# Patient Record
Sex: FEMALE | Race: WHITE | Employment: OTHER | URBAN - METROPOLITAN AREA
[De-identification: names, ages, dates, MRNs, and addresses within clinical notes are randomized per-mention and may not be internally consistent; named-entity substitution may affect disease eponyms.]

---

## 2017-01-31 ENCOUNTER — TELEPHONE (OUTPATIENT)
Dept: ENDOCRINOLOGY | Age: 72
End: 2017-01-31

## 2017-01-31 NOTE — TELEPHONE ENCOUNTER
Received call today around 5:30 AM. Patient noted that she had a recent upper respiratory illness with a persistent cough that has kept her from sleeping the past few nights. She notably had parathyroid surgery approx. 2 months ago and wanted to know if her persistent cough could be related to that. I advised her that it would be unusual if she had done well up until her recent upper respiratory issue. More likely, based on history provided, that it is related to upper respiratory infection. I advised she speak with her PCP to discuss a cough suppressant that might help her sleep through the night. She is advised that if it becomes chronic she can advised  [facility]. She was welcomed to call back with any other concerns.

## 2017-03-10 ENCOUNTER — TELEPHONE (OUTPATIENT)
Dept: ENDOCRINOLOGY | Age: 72
End: 2017-03-10

## 2017-03-10 NOTE — TELEPHONE ENCOUNTER
----- Message from Balaji Jorge sent at 3/10/2017  8:41 AM EST -----  Regarding: Dr. Angela Mancini: [phone]  Best contact number [phone]. Pt  had a virus and a cold since January and is feeling better now, and wants to know if she need to do anything special before she takes her blood work.

## 2017-03-10 NOTE — TELEPHONE ENCOUNTER
I returned Giovanna's call and she said she started with a \"virus\" on Jan 27th, and had some very bad coughing. She is still congested and has no energy. She wondered if it was ok to do her labs while she was feeling this way. I told her it was ok to do this and she will follow up at her appointment on the 17th.   Jareth Mejia

## 2017-03-14 LAB
ALBUMIN SERPL-MCNC: 4.6 G/DL (ref 3.5–4.8)
BUN SERPL-MCNC: 16 MG/DL (ref 8–27)
BUN/CREAT SERPL: 20 (ref 11–26)
CALCIUM SERPL-MCNC: 9 MG/DL (ref 8.7–10.3)
CHLORIDE SERPL-SCNC: 103 MMOL/L (ref 96–106)
CO2 SERPL-SCNC: 25 MMOL/L (ref 18–29)
CREAT SERPL-MCNC: 0.79 MG/DL (ref 0.57–1)
GLUCOSE SERPL-MCNC: 98 MG/DL (ref 65–99)
PHOSPHATE SERPL-MCNC: 3.8 MG/DL (ref 2.5–4.5)
POTASSIUM SERPL-SCNC: 4.1 MMOL/L (ref 3.5–5.2)
SODIUM SERPL-SCNC: 143 MMOL/L (ref 134–144)

## 2017-03-17 ENCOUNTER — OFFICE VISIT (OUTPATIENT)
Dept: ENDOCRINOLOGY | Age: 72
End: 2017-03-17

## 2017-03-17 VITALS
SYSTOLIC BLOOD PRESSURE: 136 MMHG | HEART RATE: 67 BPM | WEIGHT: 121 LBS | HEIGHT: 62 IN | DIASTOLIC BLOOD PRESSURE: 67 MMHG | BODY MASS INDEX: 22.26 KG/M2

## 2017-03-17 DIAGNOSIS — E21.0 PRIMARY HYPERPARATHYROIDISM (HCC): Primary | ICD-10-CM

## 2017-03-17 NOTE — MR AVS SNAPSHOT
Visit Information Date & Time Provider Department Dept. Phone Encounter #  
 3/17/2017 11:10 AM Nelle Duverney, MD [facility] [phone] [id number] Follow-up Instructions Return in about 6 months (around 9/17/2017). Upcoming Health Maintenance Date Due DTaP/Tdap/Td series (1 - Tdap) 7/28/1965 BREAST CANCER SCRN MAMMOGRAM 7/28/1994 FOBT Q 1 YEAR AGE 50-75 7/28/1994 ZOSTER VACCINE AGE 60> 7/28/2004 GLAUCOMA SCREENING Q2Y 7/28/2009 OSTEOPOROSIS SCREENING (DEXA) 7/28/2009 Pneumococcal 65+ Low/Medium Risk (1 of 2 - PCV13) 7/28/2009 MEDICARE YEARLY EXAM 7/28/2009 INFLUENZA AGE 9 TO ADULT 8/1/2016 Allergies as of 3/17/2017  Review Complete On: 3/17/2017 By: Nelle Duverney, MD  
 No Known Allergies Current Immunizations  Never Reviewed No immunizations on file. Not reviewed this visit You Were Diagnosed With   
  
 Codes Comments [facility]    -  Primary ICD-10-CM: E21.0 ICD-9-CM: 252.01 Vitals BP Pulse Height(growth percentile) Weight(growth percentile) BMI OB Status 136/67 (BP 1 Location: Left arm, BP Patient Position: Sitting) 67 5' 2\" (1.575 m) 121 lb (54.9 kg) 22.13 kg/m2 Hysterectomy Smoking Status Never Smoker Vitals History BMI and BSA Data Body Mass Index Body Surface Area  
 22.13 kg/m 2 1.55 m 2 Preferred Pharmacy Pharmacy Name Phone [facility] [address] [facility], [facility] [address] Your Updated Medication List  
  
   
This list is accurate as of: 3/17/17 11:37 AM.  Always use your most recent med list. amLODIPine 5 mg tablet Commonly known as:  Lauren Fields Take 1 Tab by mouth daily. cholecalciferol (vitamin D3) 2,000 unit Tab Take  by mouth. docusate sodium 50 mg capsule Commonly known as:  Andrea Noyola  
 Take 2 Caps by mouth two (2) times daily as needed for Constipation. Follow-up Instructions Return in about 6 months (around 9/17/2017). To-Do List   
 09/11/2017 Lab:  PTH INTACT   
  
 09/11/2017 Lab:  RENAL FUNCTION PANEL Introducing [facility] SERVICES! [facility] introduces [employer] patient portal. Now you can access parts of your medical record, email your doctor's office, and request medication refills online. 1. In your internet browser, go to [email or web address] 2. Click on the First Time User? Click Here link in the Sign In box. You will see the New Member Sign Up page. 3. Enter your Dakim Access Code exactly as it appears below. You will not need to use this code after youve completed the sign-up process. If you do not sign up before the expiration date, you must request a new code. · Dakim Access Code: [email or web address] Expires: 6/15/2017 10:45 AM 
 
4. Enter the last four digits of your Social Security Number (xxxx) and Date of Birth (mm/dd/yyyy) as indicated and click Submit. You will be taken to the next sign-up page. 5. Create a Dakim ID. This will be your Dakim login ID and cannot be changed, so think of one that is secure and easy to remember. 6. Create a Dakim password. You can change your password at any time. 7. Enter your Password Reset Question and Answer. This can be used at a later time if you forget your password. 8. Enter your e-mail address. You will receive e-mail notification when new information is available in [address] 9. Click Sign Up. You can now view and download portions of your medical record. 10. Click the Download Summary menu link to download a portable copy of your medical information. If you have questions, please visit the Frequently Asked Questions section of the Dakim website. Remember, Dakim is NOT to be used for urgent needs. For medical emergencies, dial 911. Now available from your iPhone and Android! Please provide this summary of care documentation to your next provider. Your primary care clinician is listed as Kasie Max. If you have any questions after today's visit, please call [phone].

## 2017-03-17 NOTE — PROGRESS NOTES
Chief Complaint   Patient presents with    Thyroid Problem     parathyroid. PCP and pharmacy verified   Record since last visit reviewed  History of Present Illness: Giovanna Sierra is a 66 y.o. female here for follow up of primary hyperparathyroidism. Pt presented to her PCP in July 2015 with complaints of difficulty walking, standing, blurred vision and watery eyes. As part of the work-up pt was found to have a TSH of 1.73, B12 555, Cr 0.8 Calcium 11.8, Phos 3.0 and . At our initial visit our work up included imaging which showed an oval solid nodule contiguous with the lower pole of the left   thyroid lobe compatible with enlarged parathyroid, and correlating with   today's nuclear imaging. It measures approximately 1.6 x 0.7 x 0.6 cm. The initial images demonstrate an asymmetric focus of increased radiotracer   localization adjacent to the lower pole of the left thyroid lobe. The delayed images demonstrate persistent radiotracer focal retention, left   Lower. Pt was referred to Dr. Christal Olvera, who took her to the OR on 12/2/16 and found two enlarged parathyroid glands (Inferior Left and Superior Right). These were resected and sent to pathology. They were both read as benign parathyroid adenoma. Her pre-op PTH was 242 and her post-op PTH was 7.3. She has labs drawn last week her Ca was 9.0. She is still taking the Vitamin D supplement daily. Pt notes that she was feeling stronger and feeling better after surgery. In January 2017 she got a viral GI infection and that \"Hit me pretty hard\". She notes that she is recovering well from the infection and is feeling better. Pt notes she is recovering well from the surgery. She notes that her energy is improving, though she notes \"I still get tired very easily\". She notes her symptoms of joint pains are still present, but better. She denies numbness in her hand or sierra-oral numbness.     Pt denies issues of renal stones, dysuria, hematuria, flank pain. She notes that \"sometimes I can get a little auguste\". She denies new joint pains, worsening joint pains or joint swelling or erythema. Pt has hx of osteopenia, but not osteoporosis. Current Outpatient Prescriptions   Medication Sig    docusate sodium (COLACE) 50 mg capsule Take 2 Caps by mouth two (2) times daily as needed for Constipation.  amLODIPine (NORVASC) 5 mg tablet Take 1 Tab by mouth daily.  cholecalciferol, vitamin D3, 2,000 unit tab Take  by mouth. No current facility-administered medications for this visit. No Known Allergies  Review of Systems:  - Cardiovascular: no chest pain  - Neurological: no tremors  - Integumentary: skin is normal    Physical Examination:  Blood pressure 136/67, pulse 67, height 5' 2\" (1.575 m), weight 121 lb (54.9 kg). - General: pleasant, no distress, good eye contact   - Neck: Op site healing well, no swelling or erythema,   - Cardiovascular: regular, normal rate, nl s1 and s2, no m/r/g   - Integumentary: skin is normal, no edema  - Neurological: reflexes 2+ at biceps, no tremors, - Chevostic sign  - Psychiatric: normal mood and affect    Data Reviewed:   Component      Latest Ref Rng & Units 3/13/2017           8:56 AM   Glucose      65 - 99 mg/dL 98   BUN      8 - 27 mg/dL 16   Creatinine      0.57 - 1.00 mg/dL 0.79   GFR est non-AA      >59 mL/min/1.73 75   GFR est AA      >59 mL/min/1.73 86   BUN/Creatinine ratio      11 - 26 20   Sodium      134 - 144 mmol/L 143   Potassium      3.5 - 5.2 mmol/L 4.1   Chloride      96 - 106 mmol/L 103   CO2      18 - 29 mmol/L 25   Calcium      8.7 - 10.3 mg/dL 9.0   Phosphorus      2.5 - 4.5 mg/dL 3.8   Albumin      3.5 - 4.8 g/dL 4.6     Assessment/Plan:   1) Hyperparathyroidism > Pt underwent parathyroidectomy and has recovered well. Her Ca level is good and she is doing well overall.  Will check a PTH and renal panel before our next visit in 6 months and if everything looks good we can discharge her from my care at that time. 2) Hypovitaminosis D > Pt to continue her Vitamin D 2000 units daily. Pt voices understanding and agreement with the plan. RTC 6 months      Follow-up Disposition:  Return in about 6 months (around 9/17/2017).     Copy sent to:  Dr. Lindsey Wagner

## 2017-03-23 DIAGNOSIS — E21.0 PRIMARY HYPERPARATHYROIDISM (HCC): ICD-10-CM

## 2017-04-20 ENCOUNTER — TELEPHONE (OUTPATIENT)
Dept: ENDOCRINOLOGY | Age: 72
End: 2017-04-20

## 2017-04-20 NOTE — TELEPHONE ENCOUNTER
Patient is requesting a call back in regards to taking vitamins. She can be reached at [phone]. Thank you.

## 2017-04-20 NOTE — TELEPHONE ENCOUNTER
Spoke with patient. She states that when she was on taking Vitamin D3, she felt great. Then she got a virus x 2 mos and feels aches/pains again, like prior to thyroid surgery. She had heard that taking Vitamin D with Magnesium, helps. She wonders if she should be taking Magnesium with the Vitamin D. If so what strength? She prefers the \"gel\" kind.

## 2017-04-20 NOTE — TELEPHONE ENCOUNTER
She can take Vitamin D 2000 units a day. She can get this at any drug store, it is not expensive. For the magnesium she can get 250mg per day.

## 2017-08-23 LAB
ALBUMIN SERPL-MCNC: 4.5 G/DL (ref 3.5–4.8)
BUN SERPL-MCNC: 18 MG/DL (ref 8–27)
BUN/CREAT SERPL: 19 (ref 12–28)
CALCIUM SERPL-MCNC: 9.8 MG/DL (ref 8.7–10.3)
CHLORIDE SERPL-SCNC: 99 MMOL/L (ref 96–106)
CO2 SERPL-SCNC: 25 MMOL/L (ref 18–29)
CREAT SERPL-MCNC: 0.96 MG/DL (ref 0.57–1)
GLUCOSE SERPL-MCNC: 100 MG/DL (ref 65–99)
INTERPRETATION: NORMAL
PHOSPHATE SERPL-MCNC: 4.6 MG/DL (ref 2.5–4.5)
POTASSIUM SERPL-SCNC: 4.6 MMOL/L (ref 3.5–5.2)
PTH-INTACT SERPL-MCNC: 19 PG/ML (ref 15–65)
SODIUM SERPL-SCNC: 140 MMOL/L (ref 134–144)

## 2017-09-07 ENCOUNTER — OFFICE VISIT (OUTPATIENT)
Dept: ENDOCRINOLOGY | Age: 72
End: 2017-09-07

## 2017-09-07 VITALS
SYSTOLIC BLOOD PRESSURE: 139 MMHG | DIASTOLIC BLOOD PRESSURE: 74 MMHG | HEIGHT: 62 IN | HEART RATE: 63 BPM | WEIGHT: 126.6 LBS | BODY MASS INDEX: 23.3 KG/M2

## 2017-09-07 DIAGNOSIS — E55.9 HYPOVITAMINOSIS D: ICD-10-CM

## 2017-09-07 DIAGNOSIS — E21.0 PRIMARY HYPERPARATHYROIDISM (HCC): Primary | ICD-10-CM

## 2017-09-07 NOTE — PROGRESS NOTES
Chief Complaint   Patient presents with    Thyroid Problem     Parathyroidism      Record since last visit reviewed  History of Present Illness: Giovanna Sierra is a 67 y.o. female here for follow up of primary hyperparathyroidism. Pt presented to her PCP in July 2015 with complaints of difficulty walking, standing, blurred vision and watery eyes. As part of the work-up pt was found to have a TSH of 1.73, B12 555, Cr 0.8 Calcium 11.8, Phos 3.0 and . At our initial visit our work up included imaging which showed an oval solid nodule contiguous with the lower pole of the left   thyroid lobe compatible with enlarged parathyroid, and correlating with   today's nuclear imaging. It measures approximately 1.6 x 0.7 x 0.6 cm. The initial images demonstrate an asymmetric focus of increased radiotracer   localization adjacent to the lower pole of the left thyroid lobe. The delayed images demonstrate persistent radiotracer focal retention, left   Lower. Pt was referred to Dr. Karime Arce, who took her to the OR on 12/2/16 and found two enlarged parathyroid glands (Inferior Left and Superior Right). These were resected and sent to pathology. They were both read as benign parathyroid adenoma. Her pre-op PTH was 242 and her post-op PTH was 7.3. She has labs drawn last week her Ca was 9.8 with Albumin 4.5 and PTH of 19. She is still taking the Vitamin D supplement 2000 units daily. Pt notes she is recovering well from the surgery. She notes that her energy is improving, though she notes \"I still get tired very easily\". She notes her symptoms of joint pains are still present, but better. She denies numbness in her hand or sierra-oral numbness. Pt denies issues of renal stones, dysuria, hematuria, flank pain. She notes that \"sometimes I can get a little auguste\". She denies new joint pains, worsening joint pains or joint swelling or erythema. Pt has hx of osteopenia, but not osteoporosis.        Current Outpatient Prescriptions   Medication Sig    docusate sodium (COLACE) 50 mg capsule Take 2 Caps by mouth two (2) times daily as needed for Constipation.  amLODIPine (NORVASC) 5 mg tablet Take 1 Tab by mouth daily.  cholecalciferol, vitamin D3, 2,000 unit tab Take  by mouth. No current facility-administered medications for this visit. No Known Allergies  Review of Systems:  - Cardiovascular: no chest pain  - Neurological: no tremors  - Integumentary: skin is normal    Physical Examination:  Blood pressure 139/74, pulse 63, height 5' 2\" (1.575 m), weight 126 lb 9.6 oz (57.4 kg). - General: pleasant, no distress, good eye contact   - Neck: Op site healing well, no swelling or erythema,   - Cardiovascular: regular, normal rate, nl s1 and s2, no m/r/g   - Integumentary: skin is normal, no edema  - Neurological: reflexes 2+ at biceps, no tremors, - Chevostic sign  - Psychiatric: normal mood and affect    Data Reviewed:   Component      Latest Ref Rng & Units 8/22/2017 8/22/2017          10:08 AM 10:08 AM   Glucose      65 - 99 mg/dL  100 (H)   BUN      8 - 27 mg/dL  18   Creatinine      0.57 - 1.00 mg/dL  0.96   GFR est non-AA      >59 mL/min/1.73  59 (L)   GFR est AA      >59 mL/min/1.73  68   BUN/Creatinine ratio      12 - 28  19   Sodium      134 - 144 mmol/L  140   Potassium      3.5 - 5.2 mmol/L  4.6   Chloride      96 - 106 mmol/L  99   CO2      18 - 29 mmol/L  25   Calcium      8.7 - 10.3 mg/dL  9.8   Phosphorus      2.5 - 4.5 mg/dL  4.6 (H)   Albumin      3.5 - 4.8 g/dL  4.5   PTH, Intact      15 - 65 pg/mL 19      Assessment/Plan:   1) Hyperparathyroidism > Pt underwent parathyroidectomy and has recovered well. Her Ca and PTH levels look good and they have been good since her surgery in December 2016. No need for further follow up with me at this time. Dr. Clark Seymour should check her Ca level every 6 months to ensure it remains in a good range.     2) Hypovitaminosis D > Pt to continue her Vitamin D 2000 units daily. Pt voices understanding and agreement with the plan.     Copy sent to:  Dr. Henry Carmona

## 2017-09-07 NOTE — MR AVS SNAPSHOT
Visit Information Date & Time Provider Department Dept. Phone Encounter #  
 9/7/2017 10:50 AM Isrrael Bassett, [address] [place] [address] [facility] [id number] Upcoming Health Maintenance Date Due DTaP/Tdap/Td series (1 - Tdap) 7/28/1965 BREAST CANCER SCRN MAMMOGRAM 7/28/1994 FOBT Q 1 YEAR AGE 50-75 7/28/1994 ZOSTER VACCINE AGE 60> 5/28/2004 GLAUCOMA SCREENING Q2Y 7/28/2009 OSTEOPOROSIS SCREENING (DEXA) 7/28/2009 Pneumococcal 65+ Low/Medium Risk (1 of 2 - PCV13) 7/28/2009 MEDICARE YEARLY EXAM 7/28/2009 INFLUENZA AGE 9 TO ADULT 8/1/2017 Allergies as of 9/7/2017  Review Complete On: 9/7/2017 By: Isrrael Bassett MD  
 No Known Allergies Current Immunizations  Never Reviewed No immunizations on file. Not reviewed this visit You Were Diagnosed With   
  
 Codes Comments Primary hyperparathyroidism ([facility]    -  Primary ICD-10-CM: E21.0 ICD-9-CM: 252.01 Hypovitaminosis D     ICD-10-CM: E55.9 ICD-9-CM: 268.9 Vitals BP Pulse Height(growth percentile) Weight(growth percentile) BMI OB Status 139/74 (BP 1 Location: Right arm, BP Patient Position: Sitting) 63 5' 2\" (1.575 m) 126 lb 9.6 oz (57.4 kg) 23.16 kg/m2 Hysterectomy Smoking Status Never Smoker Vitals History BMI and BSA Data Body Mass Index Body Surface Area  
 23.16 kg/m 2 1.58 m 2 Preferred Pharmacy Pharmacy Name Phone [facility] [address] [place] [address] Your Updated Medication List  
  
   
This list is accurate as of: 9/7/17 10:53 AM.  Always use your most recent med list. amLODIPine 5 mg tablet Commonly known as:  Sohail Landis Take 1 Tab by mouth daily. cholecalciferol (vitamin D3) 2,000 unit Tab Take  by mouth. docusate sodium 50 mg capsule Commonly known as:  Viola Perez  
 Take 2 Caps by mouth two (2) times daily as needed for Constipation. Introducing [facility] & HEALTH SERVICES! [facility] introduces uberall patient portal. Now you can access parts of your medical record, email your doctor's office, and request medication refills online. 1. In your internet browser, go to [email or web address] 2. Click on the First Time User? Click Here link in the Sign In box. You will see the New Member Sign Up page. 3. Enter your uberall Access Code exactly as it appears below. You will not need to use this code after youve completed the sign-up process. If you do not sign up before the expiration date, you must request a new code. · uberall Access Code: [email or web address] Expires: 12/6/2017 10:53 AM 
 
4. Enter the last four digits of your Social Security Number (xxxx) and Date of Birth (mm/dd/yyyy) as indicated and click Submit. You will be taken to the next sign-up page. 5. Create a uberall ID. This will be your uberall login ID and cannot be changed, so think of one that is secure and easy to remember. 6. Create a uberall password. You can change your password at any time. 7. Enter your Password Reset Question and Answer. This can be used at a later time if you forget your password. 8. Enter your e-mail address. You will receive e-mail notification when new information is available in [address] 9. Click Sign Up. You can now view and download portions of your medical record. 10. Click the Download Summary menu link to download a portable copy of your medical information. If you have questions, please visit the Frequently Asked Questions section of the uberall website. Remember, uberall is NOT to be used for urgent needs. For medical emergencies, dial 911. Now available from your iPhone and Android! Please provide this summary of care documentation to your next provider. Your primary care clinician is listed as Richmond Garcia. If you have any questions after today's visit, please call [phone].

## 2017-09-11 DIAGNOSIS — E21.0 PRIMARY HYPERPARATHYROIDISM (HCC): ICD-10-CM

## 2017-09-18 ENCOUNTER — TELEPHONE (OUTPATIENT)
Dept: ENDOCRINOLOGY | Age: 72
End: 2017-09-18

## 2017-09-18 DIAGNOSIS — E55.9 HYPOVITAMINOSIS D: ICD-10-CM

## 2017-09-18 DIAGNOSIS — R53.83 FATIGUE, UNSPECIFIED TYPE: ICD-10-CM

## 2017-09-18 DIAGNOSIS — E21.0 PRIMARY HYPERPARATHYROIDISM (HCC): Primary | ICD-10-CM

## 2017-09-18 DIAGNOSIS — R25.2 BILATERAL LEG CRAMPS: ICD-10-CM

## 2017-09-18 DIAGNOSIS — E83.52 HYPERCALCEMIA: ICD-10-CM

## 2017-09-18 NOTE — TELEPHONE ENCOUNTER
Spoke with patient. She states that she had parathyroid surgery 12/2016. In Feb, 2017 she developed a cough for 14 days, then was \"sick\" for 2 months. Since May she has had tiredness, joint pains and bones hurt. These have gradually developed since May, 2017. For the past month, she has been having bad cramps in lower and upper legs. Her thighs are sore. She is taking 1000 iu of vitamin D. Wonders if needs to increase the dose. She walks 1/2-2 miles per day. Yesterday was a really bad day.

## 2017-09-18 NOTE — TELEPHONE ENCOUNTER
said OK to add a magnesium level. Patient has been notified. Lab orders have been faxed to the [address] in [address] [place] [address].

## 2017-09-18 NOTE — TELEPHONE ENCOUNTER
Patient called to say that she had Parathyroid surgery earlier, and is having \"thyroid symptoms\" again. She would appreciate a call back at:  [phone].

## 2017-09-19 LAB
25(OH)D3+25(OH)D2 SERPL-MCNC: 22.8 NG/ML (ref 30–100)
ALBUMIN SERPL-MCNC: 4.5 G/DL (ref 3.5–4.8)
BUN SERPL-MCNC: 17 MG/DL (ref 8–27)
BUN/CREAT SERPL: 20 (ref 12–28)
CALCIUM SERPL-MCNC: 9.2 MG/DL (ref 8.7–10.3)
CHLORIDE SERPL-SCNC: 100 MMOL/L (ref 96–106)
CO2 SERPL-SCNC: 23 MMOL/L (ref 18–29)
CREAT SERPL-MCNC: 0.85 MG/DL (ref 0.57–1)
GLUCOSE SERPL-MCNC: 109 MG/DL (ref 65–99)
MAGNESIUM SERPL-MCNC: 2.2 MG/DL (ref 1.6–2.3)
PHOSPHATE SERPL-MCNC: 3.9 MG/DL (ref 2.5–4.5)
POTASSIUM SERPL-SCNC: 3.9 MMOL/L (ref 3.5–5.2)
SODIUM SERPL-SCNC: 139 MMOL/L (ref 134–144)
T4 FREE SERPL-MCNC: 0.65 NG/DL (ref 0.82–1.77)
TSH SERPL DL<=0.005 MIU/L-ACNC: 2.45 UIU/ML (ref 0.45–4.5)

## 2017-09-19 NOTE — PROGRESS NOTES
1) Her Calcium and Magnesium levels are looking very good. 2) Her Vitamin D level was 23, the goal is to keep the Vitamin D above 30, so increasing her Vitamin D to 2000 units per day would be appropriate. 3) Her Thyroid level is looking good as well, she does not need to start a thyroid hormone replacement.

## 2017-09-20 ENCOUNTER — TELEPHONE (OUTPATIENT)
Dept: ENDOCRINOLOGY | Age: 72
End: 2017-09-20

## 2017-09-20 NOTE — TELEPHONE ENCOUNTER
----- Message from Eder Dominguez sent at 9/20/2017  8:30 AM EDT -----  Regarding: /Telephone  Pt would like to speak with Erica Salvador. Pt stated to call before 12 noon. Best contact number is [phone].

## 2017-09-27 ENCOUNTER — TELEPHONE (OUTPATIENT)
Dept: ENDOCRINOLOGY | Age: 72
End: 2017-09-27

## 2017-09-27 NOTE — TELEPHONE ENCOUNTER
Patient stated that she had spoken with you last week about increasing her vitamin d. She stated that she has been doing great with that but has had a headache for about 5 days now and nothing seems to get rid of it. She would like to know if it is related to the increase in the vitamin d. Patient can be reached at [phone].

## 2017-09-27 NOTE — TELEPHONE ENCOUNTER
Patient has been advised. She might try to stop the Vit D, then if headache resolves, will rechallenge. Advised to see PCP if not better.

## 2017-10-03 ENCOUNTER — HOSPITAL ENCOUNTER (OUTPATIENT)
Dept: MRI IMAGING | Age: 72
Discharge: HOME | End: 2017-10-03
Attending: NEUROLOGICAL SURGERY
Payer: MEDICARE

## 2017-10-03 DIAGNOSIS — D49.7 PITUITARY TUMOR: ICD-10-CM

## 2017-10-03 DIAGNOSIS — R93.0 NONSPECIFIC ABNORMAL FINDINGS ON RADIOLOGICAL AND EXAMINATION OF SKULL AND HEAD: ICD-10-CM

## 2017-10-03 PROCEDURE — 74011000258 HC RX REV CODE- 258: Performed by: NEUROLOGICAL SURGERY

## 2017-10-03 PROCEDURE — 70544 MR ANGIOGRAPHY HEAD W/O DYE: CPT

## 2017-10-03 PROCEDURE — A9585 GADOBUTROL INJECTION: HCPCS | Performed by: NEUROLOGICAL SURGERY

## 2017-10-03 PROCEDURE — 70553 MRI BRAIN STEM W/O & W/DYE: CPT

## 2017-10-03 PROCEDURE — 74011250636 HC RX REV CODE- 250/636: Performed by: NEUROLOGICAL SURGERY

## 2017-10-03 RX ADMIN — SODIUM CHLORIDE 30 ML: 900 INJECTION, SOLUTION INTRAVENOUS at 14:07

## 2017-10-03 RX ADMIN — GADOBUTROL 7 ML: 604.72 INJECTION INTRAVENOUS at 14:07

## 2017-10-04 ENCOUNTER — HOSPITAL ENCOUNTER (INPATIENT)
Age: 72
LOS: 6 days | Discharge: HOME | DRG: 614 | End: 2017-10-10
Attending: NEUROLOGICAL SURGERY | Admitting: NEUROLOGICAL SURGERY
Payer: MEDICARE

## 2017-10-04 PROBLEM — D35.2 PITUITARY ADENOMA (HCC): Status: ACTIVE | Noted: 2017-10-04

## 2017-10-04 LAB
ANION GAP SERPL CALC-SCNC: 12 MMOL/L (ref 5–15)
BASOPHILS # BLD: 0 K/UL (ref 0–0.1)
BASOPHILS NFR BLD: 0 % (ref 0–1)
BUN SERPL-MCNC: 13 MG/DL (ref 6–20)
BUN/CREAT SERPL: 16 (ref 12–20)
CALCIUM SERPL-MCNC: 8.6 MG/DL (ref 8.5–10.1)
CHLORIDE SERPL-SCNC: 88 MMOL/L (ref 97–108)
CO2 SERPL-SCNC: 22 MMOL/L (ref 21–32)
CORTIS PM SERPL-MCNC: 6.9 UG/DL (ref 3.1–16.7)
CREAT SERPL-MCNC: 0.81 MG/DL (ref 0.55–1.02)
EOSINOPHIL # BLD: 0.1 K/UL (ref 0–0.4)
EOSINOPHIL NFR BLD: 2 % (ref 0–7)
ERYTHROCYTE [DISTWIDTH] IN BLOOD BY AUTOMATED COUNT: 12 % (ref 11.5–14.5)
GLUCOSE SERPL-MCNC: 149 MG/DL (ref 65–100)
HCT VFR BLD AUTO: 36.5 % (ref 35–47)
HGB BLD-MCNC: 13.2 G/DL (ref 11.5–16)
INR PPP: 1 (ref 0.9–1.1)
LYMPHOCYTES # BLD: 1.4 K/UL (ref 0.8–3.5)
LYMPHOCYTES NFR BLD: 23 % (ref 12–49)
MCH RBC QN AUTO: 30.4 PG (ref 26–34)
MCHC RBC AUTO-ENTMCNC: 36.2 G/DL (ref 30–36.5)
MCV RBC AUTO: 84.1 FL (ref 80–99)
MONOCYTES # BLD: 0.4 K/UL (ref 0–1)
MONOCYTES NFR BLD: 7 % (ref 5–13)
NEUTS SEG # BLD: 3.9 K/UL (ref 1.8–8)
NEUTS SEG NFR BLD: 68 % (ref 32–75)
PLATELET # BLD AUTO: 182 K/UL (ref 150–400)
POTASSIUM SERPL-SCNC: 3.3 MMOL/L (ref 3.5–5.1)
PROTHROMBIN TIME: 10.4 SEC (ref 9–11.1)
RBC # BLD AUTO: 4.34 M/UL (ref 3.8–5.2)
SODIUM SERPL-SCNC: 122 MMOL/L (ref 136–145)
WBC # BLD AUTO: 5.8 K/UL (ref 3.6–11)

## 2017-10-04 PROCEDURE — 0NQC4ZZ REPAIR SPHENOID BONE, PERCUTANEOUS ENDOSCOPIC APPROACH: ICD-10-PCS | Performed by: NEUROLOGICAL SURGERY

## 2017-10-04 PROCEDURE — 85025 COMPLETE CBC W/AUTO DIFF WBC: CPT | Performed by: NURSE PRACTITIONER

## 2017-10-04 PROCEDURE — 36415 COLL VENOUS BLD VENIPUNCTURE: CPT | Performed by: NURSE PRACTITIONER

## 2017-10-04 PROCEDURE — 0G904ZX DRAINAGE OF PITUITARY GLAND, PERCUTANEOUS ENDOSCOPIC APPROACH, DIAGNOSTIC: ICD-10-PCS | Performed by: NEUROLOGICAL SURGERY

## 2017-10-04 PROCEDURE — 74011250637 HC RX REV CODE- 250/637: Performed by: NURSE PRACTITIONER

## 2017-10-04 PROCEDURE — 65660000000 HC RM CCU STEPDOWN

## 2017-10-04 PROCEDURE — 82533 TOTAL CORTISOL: CPT | Performed by: NURSE PRACTITIONER

## 2017-10-04 PROCEDURE — 85610 PROTHROMBIN TIME: CPT | Performed by: NURSE PRACTITIONER

## 2017-10-04 PROCEDURE — 83003 ASSAY GROWTH HORMONE (HGH): CPT | Performed by: NURSE PRACTITIONER

## 2017-10-04 PROCEDURE — 74011250636 HC RX REV CODE- 250/636: Performed by: NURSE PRACTITIONER

## 2017-10-04 PROCEDURE — 0GB04ZZ EXCISION OF PITUITARY GLAND, PERCUTANEOUS ENDOSCOPIC APPROACH: ICD-10-PCS | Performed by: NEUROLOGICAL SURGERY

## 2017-10-04 PROCEDURE — 0JB80ZZ EXCISION OF ABDOMEN SUBCUTANEOUS TISSUE AND FASCIA, OPEN APPROACH: ICD-10-PCS | Performed by: NEUROLOGICAL SURGERY

## 2017-10-04 PROCEDURE — 80048 BASIC METABOLIC PNL TOTAL CA: CPT | Performed by: NURSE PRACTITIONER

## 2017-10-04 PROCEDURE — 84146 ASSAY OF PROLACTIN: CPT | Performed by: NURSE PRACTITIONER

## 2017-10-04 RX ORDER — SODIUM CHLORIDE 0.9 % (FLUSH) 0.9 %
5-10 SYRINGE (ML) INJECTION EVERY 8 HOURS
Status: DISCONTINUED | OUTPATIENT
Start: 2017-10-04 | End: 2017-10-06 | Stop reason: HOSPADM

## 2017-10-04 RX ORDER — ACETAMINOPHEN 325 MG/1
650 TABLET ORAL
Status: DISCONTINUED | OUTPATIENT
Start: 2017-10-04 | End: 2017-10-06 | Stop reason: HOSPADM

## 2017-10-04 RX ORDER — LABETALOL HYDROCHLORIDE 5 MG/ML
10 INJECTION, SOLUTION INTRAVENOUS
Status: COMPLETED | OUTPATIENT
Start: 2017-10-04 | End: 2017-10-06

## 2017-10-04 RX ORDER — ONDANSETRON 4 MG/1
4 TABLET, ORALLY DISINTEGRATING ORAL
Status: DISCONTINUED | OUTPATIENT
Start: 2017-10-04 | End: 2017-10-06 | Stop reason: HOSPADM

## 2017-10-04 RX ORDER — HYDROCORTISONE SODIUM SUCCINATE 100 MG/2ML
100 INJECTION, POWDER, FOR SOLUTION INTRAMUSCULAR; INTRAVENOUS EVERY 12 HOURS
Status: DISCONTINUED | OUTPATIENT
Start: 2017-10-04 | End: 2017-10-07

## 2017-10-04 RX ORDER — AMLODIPINE BESYLATE 5 MG/1
5 TABLET ORAL DAILY
Status: DISCONTINUED | OUTPATIENT
Start: 2017-10-05 | End: 2017-10-10 | Stop reason: HOSPADM

## 2017-10-04 RX ORDER — HYDROCODONE BITARTRATE AND ACETAMINOPHEN 5; 325 MG/1; MG/1
1 TABLET ORAL
Status: DISCONTINUED | OUTPATIENT
Start: 2017-10-04 | End: 2017-10-10 | Stop reason: HOSPADM

## 2017-10-04 RX ORDER — HYDRALAZINE HYDROCHLORIDE 20 MG/ML
10 INJECTION INTRAMUSCULAR; INTRAVENOUS
Status: COMPLETED | OUTPATIENT
Start: 2017-10-04 | End: 2017-10-06

## 2017-10-04 RX ORDER — FAMOTIDINE 20 MG/1
20 TABLET, FILM COATED ORAL 2 TIMES DAILY
Status: DISCONTINUED | OUTPATIENT
Start: 2017-10-04 | End: 2017-10-10 | Stop reason: HOSPADM

## 2017-10-04 RX ORDER — SODIUM CHLORIDE 0.9 % (FLUSH) 0.9 %
5-10 SYRINGE (ML) INJECTION AS NEEDED
Status: DISCONTINUED | OUTPATIENT
Start: 2017-10-04 | End: 2017-10-06 | Stop reason: HOSPADM

## 2017-10-04 RX ADMIN — HYDROCORTISONE SODIUM SUCCINATE 100 MG: 100 INJECTION, POWDER, FOR SOLUTION INTRAMUSCULAR; INTRAVENOUS at 20:46

## 2017-10-04 RX ADMIN — FAMOTIDINE 20 MG: 20 TABLET ORAL at 19:34

## 2017-10-04 RX ADMIN — Medication 10 ML: at 20:54

## 2017-10-04 RX ADMIN — ONDANSETRON 4 MG: 4 TABLET, ORALLY DISINTEGRATING ORAL at 19:32

## 2017-10-04 RX ADMIN — HYDROCODONE BITARTRATE AND ACETAMINOPHEN 1 TABLET: 5; 325 TABLET ORAL at 19:34

## 2017-10-04 RX ADMIN — HYDROCODONE BITARTRATE AND ACETAMINOPHEN 1 TABLET: 5; 325 TABLET ORAL at 23:18

## 2017-10-04 RX ADMIN — HYDRALAZINE HYDROCHLORIDE 10 MG: 20 INJECTION INTRAMUSCULAR; INTRAVENOUS at 19:34

## 2017-10-04 NOTE — IP AVS SNAPSHOT
2700 [facility] [address] [place] [address] 
[phone] Patient: Anca Landers MRN: [id number] :1945 You are allergic to the following No active allergies Immunizations Administered for This Admission Name Date Influenza Vaccine (Quad) PF  Deferred () Recent Documentation Height Weight BMI OB Status Smoking Status 1.575 m 58 kg 23.37 kg/m2 Hysterectomy Never Smoker Emergency Contacts Name Discharge Info Relation Home Work Mobile Self,[place] DISCHARGE CAREGIVER [3] Spouse [3] [phone] About your hospitalization You were admitted on:  2017 You last received care in the:  [facility] 6S NEURO-SCI TELE You were discharged on:  October 10, 2017 Unit phone number:  [phone] Why you were hospitalized Your primary diagnosis was:  Not on File Your diagnoses also included:  Pituitary Adenoma (Hcc) Providers Seen During Your Hospitalizations Provider Role Specialty Primary office phone Zac Betancourt MD Attending Provider Neurosurgery [phone] Your Primary Care Physician (PCP) Primary Care Physician Office Phone Office Fax Ryan Martinez [phone] Follow-up Information Follow up With Details Comments Contact Info Lamar Cabezas MD Schedule an appointment as soon as possible for a visit in 1 week hospital follow-up [address] [facility] [address] [facility] [address] [phone] Zac Betancourt MD Schedule an appointment as soon as possible for a visit in 1 month  935 [facility]. [address] [place] [address] 
[phone] Katelyn Norwood MD Schedule an appointment as soon as possible for a visit in 10 days For wound re-check [address] [facility] 57 
[phone] Lopez Tracy MD Schedule an appointment as soon as possible for a visit in 3 weeks endocrine follow-up 200 [facility] [address] [facility] 
[phone] Current Discharge Medication List  
  
START taking these medications Dose & Instructions Dispensing Information Comments Morning Noon Evening Bedtime HYDROcodone-acetaminophen 5-325 mg per tablet Commonly known as:  Carlos Monteiro Your last dose was: Your next dose is:    
   
   
 Dose:  1 Tab Take 1 Tab by mouth every four (4) hours as needed. Max Daily Amount: 6 Tabs. Quantity:  22 Tab Refills:  0  
     
   
   
   
  
 hydrocortisone 10 mg tablet Commonly known as:  CORTEF Your last dose was: Your next dose is: Take 2 tabs PO every morning and 1 tab PO at 5PM  
 Quantity:  90 Tab Refills:  1  
     
   
   
   
  
 levothyroxine 75 mcg tablet Commonly known as:  SYNTHROID Your last dose was: Your next dose is:    
   
   
 Dose:  75 mcg Take 1 Tab by mouth Daily (before breakfast). Quantity:  30 Tab Refills:  1  
     
   
   
   
  
 sodium chloride 0.65 % nasal spray Commonly known as:  OCEAN Your last dose was: Your next dose is:    
   
   
 Dose:  2 Spray 2 Sprays by Both Nostrils route three (3) times daily for 10 days. Quantity:  3 mL Refills:  0 CONTINUE these medications which have CHANGED Dose & Instructions Dispensing Information Comments Morning Noon Evening Bedtime  
 amLODIPine 5 mg tablet Commonly known as:  Jody Vidal What changed:  how much to take Your last dose was: Your next dose is:    
   
   
 Dose:  7.5 mg Take 1.5 Tabs by mouth daily. Quantity:  45 Tab Refills:  0 CONTINUE these medications which have NOT CHANGED Dose & Instructions Dispensing Information Comments Morning Noon Evening Bedtime  
 cholecalciferol (vitamin D3) 2,000 unit Tab Your last dose was: Your next dose is: Take  by mouth. Refills:  0  
     
   
   
   
  
 docusate sodium 50 mg capsule Commonly known as:  Atlanta Flower Your last dose was: Your next dose is:    
   
   
 Dose:  100 mg Take 2 Caps by mouth two (2) times daily as needed for Constipation. Quantity:  30 Cap Refills:  2 Where to Get Your Medications Information on where to get these meds will be given to you by the nurse or doctor. ! Ask your nurse or doctor about these medications  
  amLODIPine 5 mg tablet HYDROcodone-acetaminophen 5-325 mg per tablet  
 hydrocortisone 10 mg tablet  
 levothyroxine 75 mcg tablet  
 sodium chloride 0.65 % nasal spray Discharge Instructions Diet - as tolerated. Continue 1200 ml fluid restriction per 24 hours. This should be continued for the next few weeks until you see your endocrine doctor. Activity - Keep head elevated on 2-3 pillows for 2-3 weeks. No driving while on pain medication. No bending over, lifting greater than 5 pounds. Wound care - continue to use saline nasal spray three times a day. It is ok to shower. If you develop a fever greater than 100.5, severe nausea, vomiting, severe neck stiffness, shortness of breath, chest pain, leg pain, leg swelling, call the MD at [phone]. Someone is on call 24/7. If you have trouble getting into the endocrinologist office in less than a month, please call Dr. Plasencia Payment office. It is important that you take your blood pressure medication as prescribed. You should follow-up with your PCP in 1 week to discuss your BP medication. You should get a home blood pressure cuff and check your blood pressure in the morning and in the evening and keep a log that you can take to your doctor's visit. You have been given prescriptions for the following medications: 1. Cortef - take 20 mg in the am and 10 mg at 5PM - this is a steroid.  It augments your body's natural steroid production since the pituitary gland function has been affected by the tumor that was removed. Take them medication as directed until you see your endocrinologist to determine if you need to continue to take this medication. 2. Levothyroxine 75 mcg - this medication supplements your thyroid hormone. Take this medication as prescribed until you see your endocrine doctor to determine if you need to continue this replacement. 3. Amlodipine 7.5 mg - this is an increase in the dosage of your home blood pressure medication. The dosage equals 1.5 tabs of the BP pills you have at home. 4. Norco 5/325 mg - this is a pain medication. Take this medication as needed. This medication has 325 mg of Tylenol in it. You have a maximum amount of 4000 mg of Tylenol in a 24 hour period. You may take plain tylenol in between doses of this medication, but will need to keep track of the Tylenol amount. 5. Saline nasal spray - 2 sprays both nostrils three times a day - use as directed. This helps to moisten the nasal passageways. Cont this medication until you follow-up with Dr. Keshav Kumar. Discharge Orders None [facility] Announcement We are excited to announce that we are making your provider's discharge notes available to you in [facility]. You will see these notes when they are completed and signed by the physician that discharged you from your recent hospital stay. If you have any questions or concerns about any information you see in [facility], please call the Health Information Department where you were seen or reach out to your Primary Care Provider for more information about your plan of care. Introducing [facility]! Coby Day introduces [facility] patient portal. Now you can access parts of your medical record, email your doctor's office, and request medication refills online. 1. In your internet browser, go to [email or web address] 2. Click on the First Time User? Click Here link in the Sign In box. You will see the New Member Sign Up page. 3. Enter your [employer] Access Code exactly as it appears below. You will not need to use this code after youve completed the sign-up process. If you do not sign up before the expiration date, you must request a new code. · [employer] Access Code: [email or web address] Expires: 12/6/2017 10:53 AM 
 
4. Enter the last four digits of your Social Security Number (xxxx) and Date of Birth (mm/dd/yyyy) as indicated and click Submit. You will be taken to the next sign-up page. 5. Create a [employer] ID. This will be your [employer] login ID and cannot be changed, so think of one that is secure and easy to remember. 6. Create a [employer] password. You can change your password at any time. 7. Enter your Password Reset Question and Answer. This can be used at a later time if you forget your password. 8. Enter your e-mail address. You will receive e-mail notification when new information is available in [address] 9. Click Sign Up. You can now view and download portions of your medical record. 10. Click the Download Summary menu link to download a portable copy of your medical information. If you have questions, please visit the Frequently Asked Questions section of the [employer] website. Remember, [employer] is NOT to be used for urgent needs. For medical emergencies, dial 911. Now available from your iPhone and Android! General Information Please provide this summary of care documentation to your next provider. Patient Signature:  ____________________________________________________________ Date:  ____________________________________________________________  
  
[facility] Provider Signature:  ____________________________________________________________ Date:  ____________________________________________________________

## 2017-10-04 NOTE — IP AVS SNAPSHOT
[address] [place] [address] 
[phone] Patient: Iain Potts MRN: [id number] :1945 Current Discharge Medication List  
  
START taking these medications Dose & Instructions Dispensing Information Comments Morning Noon Evening Bedtime HYDROcodone-acetaminophen 5-325 mg per tablet Commonly known as:  March Castles Your last dose was: Your next dose is:    
   
   
 Dose:  1 Tab Take 1 Tab by mouth every four (4) hours as needed. Max Daily Amount: 6 Tabs. Quantity:  22 Tab Refills:  0  
     
   
   
   
  
 hydrocortisone 10 mg tablet Commonly known as:  CORTEF Your last dose was: Your next dose is: Take 2 tabs PO every morning and 1 tab PO at 5PM  
 Quantity:  90 Tab Refills:  1  
     
   
   
   
  
 levothyroxine 75 mcg tablet Commonly known as:  SYNTHROID Your last dose was: Your next dose is:    
   
   
 Dose:  75 mcg Take 1 Tab by mouth Daily (before breakfast). Quantity:  30 Tab Refills:  1  
     
   
   
   
  
 sodium chloride 0.65 % nasal spray Commonly known as:  OCEAN Your last dose was: Your next dose is:    
   
   
 Dose:  2 Spray 2 Sprays by Both Nostrils route three (3) times daily for 10 days. Quantity:  3 mL Refills:  0 CONTINUE these medications which have CHANGED Dose & Instructions Dispensing Information Comments Morning Noon Evening Bedtime  
 amLODIPine 5 mg tablet Commonly known as:  Cristofer Purdy What changed:  how much to take Your last dose was: Your next dose is:    
   
   
 Dose:  7.5 mg Take 1.5 Tabs by mouth daily. Quantity:  45 Tab Refills:  0 CONTINUE these medications which have NOT CHANGED Dose & Instructions Dispensing Information Comments Morning Noon Evening Bedtime cholecalciferol (vitamin D3) 2,000 unit Tab Your last dose was: Your next dose is: Take  by mouth. Refills:  0  
     
   
   
   
  
 docusate sodium 50 mg capsule Commonly known as:  Billie Anaya Your last dose was: Your next dose is:    
   
   
 Dose:  100 mg Take 2 Caps by mouth two (2) times daily as needed for Constipation. Quantity:  30 Cap Refills:  2 Where to Get Your Medications Information on where to get these meds will be given to you by the nurse or doctor. ! Ask your nurse or doctor about these medications  
  amLODIPine 5 mg tablet HYDROcodone-acetaminophen 5-325 mg per tablet  
 hydrocortisone 10 mg tablet  
 levothyroxine 75 mcg tablet  
 sodium chloride 0.65 % nasal spray

## 2017-10-05 ENCOUNTER — TELEPHONE (OUTPATIENT)
Dept: ENDOCRINOLOGY | Age: 72
End: 2017-10-05

## 2017-10-05 ENCOUNTER — APPOINTMENT (OUTPATIENT)
Dept: CT IMAGING | Age: 72
DRG: 614 | End: 2017-10-05
Attending: NURSE PRACTITIONER
Payer: MEDICARE

## 2017-10-05 ENCOUNTER — APPOINTMENT (OUTPATIENT)
Dept: CT IMAGING | Age: 72
DRG: 614 | End: 2017-10-05
Attending: NEUROLOGICAL SURGERY
Payer: MEDICARE

## 2017-10-05 LAB
ANION GAP SERPL CALC-SCNC: 12 MMOL/L (ref 5–15)
ANION GAP SERPL CALC-SCNC: 14 MMOL/L (ref 5–15)
BUN SERPL-MCNC: 15 MG/DL (ref 6–20)
BUN SERPL-MCNC: 16 MG/DL (ref 6–20)
BUN/CREAT SERPL: 15 (ref 12–20)
BUN/CREAT SERPL: 19 (ref 12–20)
CALCIUM SERPL-MCNC: 8.5 MG/DL (ref 8.5–10.1)
CALCIUM SERPL-MCNC: 8.6 MG/DL (ref 8.5–10.1)
CHLORIDE SERPL-SCNC: 84 MMOL/L (ref 97–108)
CHLORIDE SERPL-SCNC: 89 MMOL/L (ref 97–108)
CO2 SERPL-SCNC: 20 MMOL/L (ref 21–32)
CO2 SERPL-SCNC: 21 MMOL/L (ref 21–32)
CREAT SERPL-MCNC: 0.86 MG/DL (ref 0.55–1.02)
CREAT SERPL-MCNC: 1.03 MG/DL (ref 0.55–1.02)
GLUCOSE SERPL-MCNC: 122 MG/DL (ref 65–100)
GLUCOSE SERPL-MCNC: 143 MG/DL (ref 65–100)
MAGNESIUM SERPL-MCNC: 1.7 MG/DL (ref 1.6–2.4)
OSMOLALITY SERPL: 258 MOSM/KG H2O
OSMOLALITY UR: 427 MOSM/KG H2O
POTASSIUM SERPL-SCNC: 3.5 MMOL/L (ref 3.5–5.1)
POTASSIUM SERPL-SCNC: 3.8 MMOL/L (ref 3.5–5.1)
POTASSIUM UR-SCNC: 44 MMOL/L
SODIUM SERPL-SCNC: 117 MMOL/L (ref 136–145)
SODIUM SERPL-SCNC: 121 MMOL/L (ref 136–145)
SODIUM SERPL-SCNC: 123 MMOL/L (ref 136–145)
SODIUM UR-SCNC: 21 MMOL/L
URATE SERPL-MCNC: 3.8 MG/DL (ref 2.6–6)

## 2017-10-05 PROCEDURE — 83735 ASSAY OF MAGNESIUM: CPT | Performed by: INTERNAL MEDICINE

## 2017-10-05 PROCEDURE — 74011250637 HC RX REV CODE- 250/637: Performed by: NURSE PRACTITIONER

## 2017-10-05 PROCEDURE — 65660000000 HC RM CCU STEPDOWN

## 2017-10-05 PROCEDURE — 36415 COLL VENOUS BLD VENIPUNCTURE: CPT | Performed by: NURSE PRACTITIONER

## 2017-10-05 PROCEDURE — 74011636320 HC RX REV CODE- 636/320: Performed by: NEUROLOGICAL SURGERY

## 2017-10-05 PROCEDURE — 80048 BASIC METABOLIC PNL TOTAL CA: CPT | Performed by: INTERNAL MEDICINE

## 2017-10-05 PROCEDURE — 84300 ASSAY OF URINE SODIUM: CPT | Performed by: INTERNAL MEDICINE

## 2017-10-05 PROCEDURE — 74011250637 HC RX REV CODE- 250/637: Performed by: INTERNAL MEDICINE

## 2017-10-05 PROCEDURE — 74011250636 HC RX REV CODE- 250/636: Performed by: NURSE PRACTITIONER

## 2017-10-05 PROCEDURE — 70460 CT HEAD/BRAIN W/DYE: CPT

## 2017-10-05 PROCEDURE — 83930 ASSAY OF BLOOD OSMOLALITY: CPT | Performed by: NURSE PRACTITIONER

## 2017-10-05 PROCEDURE — 84133 ASSAY OF URINE POTASSIUM: CPT | Performed by: INTERNAL MEDICINE

## 2017-10-05 PROCEDURE — 83935 ASSAY OF URINE OSMOLALITY: CPT | Performed by: INTERNAL MEDICINE

## 2017-10-05 PROCEDURE — 84550 ASSAY OF BLOOD/URIC ACID: CPT | Performed by: INTERNAL MEDICINE

## 2017-10-05 PROCEDURE — 84295 ASSAY OF SERUM SODIUM: CPT | Performed by: NURSE PRACTITIONER

## 2017-10-05 PROCEDURE — 74011000258 HC RX REV CODE- 258: Performed by: NEUROLOGICAL SURGERY

## 2017-10-05 RX ORDER — TOLVAPTAN 15 MG/1
15 TABLET ORAL ONCE
Status: COMPLETED | OUTPATIENT
Start: 2017-10-05 | End: 2017-10-05

## 2017-10-05 RX ORDER — SODIUM CHLORIDE 0.9 % (FLUSH) 0.9 %
10 SYRINGE (ML) INJECTION
Status: COMPLETED | OUTPATIENT
Start: 2017-10-05 | End: 2017-10-05

## 2017-10-05 RX ADMIN — TOLVAPTAN 15 MG: 15 TABLET ORAL at 15:12

## 2017-10-05 RX ADMIN — ONDANSETRON 4 MG: 4 TABLET, ORALLY DISINTEGRATING ORAL at 09:01

## 2017-10-05 RX ADMIN — Medication 10 ML: at 15:15

## 2017-10-05 RX ADMIN — FAMOTIDINE 20 MG: 20 TABLET ORAL at 19:37

## 2017-10-05 RX ADMIN — Medication 10 ML: at 12:23

## 2017-10-05 RX ADMIN — HYDROCORTISONE SODIUM SUCCINATE 100 MG: 100 INJECTION, POWDER, FOR SOLUTION INTRAMUSCULAR; INTRAVENOUS at 09:01

## 2017-10-05 RX ADMIN — HYDROCORTISONE SODIUM SUCCINATE 100 MG: 100 INJECTION, POWDER, FOR SOLUTION INTRAMUSCULAR; INTRAVENOUS at 21:38

## 2017-10-05 RX ADMIN — IOPAMIDOL 100 ML: 612 INJECTION, SOLUTION INTRAVENOUS at 12:23

## 2017-10-05 RX ADMIN — Medication 10 ML: at 05:48

## 2017-10-05 RX ADMIN — FAMOTIDINE 20 MG: 20 TABLET ORAL at 09:01

## 2017-10-05 RX ADMIN — AMLODIPINE BESYLATE 5 MG: 5 TABLET ORAL at 09:14

## 2017-10-05 RX ADMIN — ONDANSETRON 4 MG: 4 TABLET, ORALLY DISINTEGRATING ORAL at 02:57

## 2017-10-05 RX ADMIN — Medication 10 ML: at 21:38

## 2017-10-05 RX ADMIN — HYDROCODONE BITARTRATE AND ACETAMINOPHEN 1 TABLET: 5; 325 TABLET ORAL at 09:31

## 2017-10-05 RX ADMIN — SODIUM CHLORIDE 100 ML: 900 INJECTION, SOLUTION INTRAVENOUS at 12:23

## 2017-10-05 NOTE — INTERDISCIPLINARY ROUNDS
IDR/SLIDR Summary          Patient: Joaquin Perez MRN: [id number]    Age: 67 y.o. YOB: 1945 Room/Bed: [facility]   Admit Diagnosis: Pituitary Tumor  [facility]  Principal Diagnosis: <principal problem not specified>   Goals: safety  Readmission: NO  Quality Measure: Not applicable  VTE Prophylaxis: Mechanical  Influenza Vaccine screening completed? YES  Pneumococcal Vaccine screening completed? NO  Mobility needs: No   Nutrition plan:Yes  Consults:    Financial concerns:No  Escalated to CM? NO  RRAT Score:16   Interventions:  Testing due for pt today?  YES  LOS: 1 days Expected length of stay tbd days  Discharge plan: home   PCP: Unruly Leahy MD  Transportation needs: No    Days before discharge:two or more days before discharge   Discharge disposition: Home    Signed:     Dianna Armenta  10/5/2017  9:44 AM

## 2017-10-05 NOTE — CONSULTS
[address]   611 [facility], [address]   1930 [facility]       Name:  Dayan Marion   MR#:  [id number]   :  1945   Account #:  [de-identified]    Date of Consultation:  10/05/2017   Date of Adm:  10/04/2017       REASON FOR CONSULTATION: Hyponatremia. Thanks for the consult. I had the pleasure of seeing this patient. She is a pleasant 70-year-old   female. History of hypertension, diagnosed with primary   hyperparathyroidism and up to 2 adenoma removed in  and . Issue with her was that the patient has been having headaches and she   ended up having an MRI of the brain and the MRI was done this month   which showed large pituitary mass containing fluid levels suggesting   positive old hemorrhage and 2 pituitary microadenoma. She was   admitted here for surgery, found to have a sodium of 122 yesterday   and was 121 today with a hyposmolarity with it. Potassium on the low   side and we were called to see her for that. The patient said she is not   polydipsic. She has no history of liver problems or heart problems. No   lower extremity edema. She is not polyuric. She is a little bit nauseous,   she cannot keep much medicine in. PAST MEDICAL HISTORY: Includes hypertension, primary   hyperparathyroidism status post surgery in 2016, pituitary mass   large with suspect bleed, hyponatremia which is not documented   before. REVIEW OF SYSTEMS: As per the HPI, for others are negative. MEDICATIONS   As an outpatient included:   1. Amlodipine that she did not take. 2. Vitamin D.   3. Colace. Inpatient medications are as follows:   1. Amlodipine 5 mg daily. 2. Pepcid. 3. Hydrocortisone 100 mg q.12h. ALLERGIES: NO KNOWN ALLERGIES. FAMILY HISTORY: Reviewed. PHYSICAL EXAMINATION   VITAL SIGNS: Blood pressure is 149/66, saturating 95%, heart rate   79. NECK: JVD is negative. ABDOMEN: Soft. EXTREMITIES: No edema.    NEUROLOGIC: Alert and oriented to time and place. LABORATORY DATA: Sodium 121, potassium 3.3, BUN is 13,   creatinine 0.8, calcium 8.6. Theophylline 255. No urine studies. Hemoglobin is 13.2 yesterday and 182 platelets. IMPRESSION   1. Hyponatremia. Fairly of recent onset since last month it was not   present. Suspect SIADH from her CNS mass, but we will send the   workup for that. The issue would be the planned surgery tomorrow and   we need to correct that sodium. She is mildly symptomatic but we do   not want to overcorrect it so will not aim any sodium above 130 and will   discuss it with no surgery  is to do that type of surgery with a sodium   in the projected range. 2. History of primary hyperparathyroidism, removed in 12/2016. 3. Hypertension. 4. Large pituitary mass. RECOMMENDATIONS   1. Get urine studies. 2. Hyponatremia workup. 3. If workup in favor of SIADH, will get Conivaptan with serial sodium   monitoring. 4. Discussed in length with her and with no surgery. MD JADYN Neri / BA   D:  10/05/2017   11:04   T:  10/05/2017   11:42   Job #:  [id number]

## 2017-10-05 NOTE — PROGRESS NOTES
Primary Nurse Mary Asif and YOVANNY MALAVE [facility], RN performed a dual skin assessment on this patient No impairment noted  Jose Rafael score is 21

## 2017-10-05 NOTE — CONSULTS
Called by NS to see patient  Seen and examined  Thanks for the consuLT  A/P:hyponatremia,of recent onset likely 2nd SIADH but need work up         Hx of 2 Parathyroid adenoma resction in 12.2016 for primary hyperpara         HTN         Pituitary mass for surgery  Check urine testing. .. Vickie Hernandez Vickie Hernandez   Already on IV steroids  If work up in favor of SIADH than will give tolvaptan;do not want to overcorrect her sodium but also need a better sodium in case surgery scheduled tomorrow  Discussed in length with her and her  and NS

## 2017-10-05 NOTE — PROGRESS NOTES
Care Management Interventions  PCP Verified by CM: Yes Ellen Her MD)  Mode of Transport at Discharge: Other (see comment)  Transition of Care Consult (CM Consult):  (To be determined.)  MyChart Signup: No  Discharge Durable Medical Equipment: No  Physical Therapy Consult: Yes  Occupational Therapy Consult: Yes  Speech Therapy Consult: No  Current Support Network: Lives with Spouse  Confirm Follow Up Transport: Other (see comment) (To be determined.)  Plan discussed with Pt/Family/Caregiver: Yes  Freedom of Choice Offered:  (N/A)  Discharge Location  Discharge Placement: Other: (Nydia Arroyo determined post op)    CM met with patient and her  Andrzej Peña with whom she lives. They have 6 supportive children \"between the 2 of us\" as well as a large extended family. Patient reports very good family /social support. Patient expects return to independent functioning. Patient confirmed PCP, health insurance, and prescription coverage. Patient is anticipating surgery tomorrow 10/6/17. CM to follow for discharge planning needs.

## 2017-10-05 NOTE — PROGRESS NOTES
Spiritual Care volunteer visit. [facility] Staff  (Kenia George Patient Care Specialist)   Paging Service [phone]

## 2017-10-05 NOTE — TELEPHONE ENCOUNTER
Received call from Dr. Ancelmo Castañeda regarding patients admission to [facility].     Patient notably s/p parathyroidectomy for primary hyperparathyroidism and was followed by Dr. Raegan Zhou in the endocrine clinic. 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 There is a fluid fluid level within this mass and there is rim enhancement. Mass extends into the suprasellar region. Superior to the mass there is a 4 mm focus of enhancement most likely related to the infundibulum. \"    Few labs pending including Prolactin. Nephrology following for hyponatremia, SIADH? Cortisol 6.9, reported to have been started on steroids. Patient is anticipated to have surgery soon. I will advise Dr. Raegan Zhou of her admission to [facility]. She will need a full re-evaluation of pituitary function at some point post-op. Of interest will be path immunostaining however with necrotic tissue, may not be revealing. Gloria Santa.  39 [facility]  [address] [place] [address]

## 2017-10-05 NOTE — PROGRESS NOTES
Feels much better since starting steroids. No headache. 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 I spoke to her endocrinologist Dr Jose Antonio Kelly who will consult as well. If Na is in near normal range, would proceed with surgery tomorrow.  No other focal deficits and clearly looks and feels better compared to when  I spoke to her on phone yesterday when I told her to come straight to the hospital.

## 2017-10-05 NOTE — PROGRESS NOTES
*ATTENTION:  This note has been created by a medical student for educational purposes only. Please do not refer to the content of this note for clinical decision-making, billing, or other purposes. Please see attending physicians note to obtain clinical information on this patient. *                                                                History & Physical  Mathew [facility]-NP Student    Date of admission: 10/4/2017    Patient name: Joaquin Perez  MRN: [id number]  YOB: 1945  Age: 67 y.o. Primary care provider:  Unruly Leahy MD     Source of Information: patient, medical records and                           Chief complaint: Pituitary mass     History of present illness  Giovanna Sierra is a 67 y.o. female with a past medical history of hypertension, high cholesterol, s/p parathyroidectomy who presents with complaints of persistent headache for about 2 weeks. Prior to this, she had not been feeling like herself and decreased energy for about 18 months. She has been followed by endocrinology, Dr. Yamileth Su, and was diagnosed with low vitamin D levels and hyperparathyroidism. She was treated with Vitamin D supplement and s/p parathyroidectomy by Dr. Ralph  12/2/2016. She stated she felt better initially post surgery with increased energy levels. Now recently, her symptoms are back including headache. She initially attributed the headaches to allergies. The headaches continued and she noticed an increased in the pain over the past 3 days. She saw her endocrinologist, Dr. Yamileth Su, on 9/27/17 and was referred to Dr. Radha Edge. A non-contrast MRI/MRA of the head was obtained on 10/3/17 and revealed a pituitary mass. She then began having visual disturbances on 10/3/17 and due to her MRI results, she was advised to be directly admitted to [facility] [place] by Dr. Radha Edge on 10/4/17 with plan for removal of the mass on Friday, 10/6/17.      Past Medical History:   Diagnosis Date    Adverse effect of anesthesia     \"TAKES A LONG TIME FOR ME TO COME OUT OF IT\"    Arthritis     High cholesterol     Hypertension     Osteopenia     Thyroid disease     Parathyroid gland removal      Past Surgical History:   Procedure Laterality Date    HX OTHER SURGICAL  2006    FACE LIFT     HX PARATHYROIDECTOMY  2016    HX AMANDA AND BSO  1980s    HX TONSILLECTOMY  age 9     Prior to Admission medications    Medication Sig Start Date End Date Taking? Authorizing Provider   docusate sodium (COLACE) 50 mg capsule Take 2 Caps by mouth two (2) times daily as needed for Constipation. 12/3/16  Yes Nita Toussaint MD   amLODIPine (NORVASC) 5 mg tablet Take 1 Tab by mouth daily. 16  Yes Monica Wall MD   cholecalciferol, vitamin D3, 2,000 unit tab Take  by mouth. Yes Historical Provider     No Known Allergies   Family History   Problem Relation Age of Onset    Heart Attack Mother     No Known Problems Father       in the war    Heart Attack Maternal Aunt     Cancer Maternal Aunt      bone cancer    Anesth Problems Neg Hx       Family history reviewed and non-contributory. Social history  Patient resides  x  Independently      With family care      Assisted living      SNF    Ambulates  x  Independently      With cane       Assisted walker         Alcohol history   x  None     Social     Chronic   Smoking history  x  None     Former smoker     Current smoker     History   Smoking Status    Never Smoker   Smokeless Tobacco    Never Used       Code status  x  Full code     DNR/DNI        Code status discussed with the patient/caregivers. Full Code    Review of systems  The patient denies any fever, chills, chest pain, cough, congestion, recent illness, palpitations, or dysuria.   + Vomiting, nausea  + Headache   + Decreased urination  + Blurry and double vision   The remainder of the review of systems was reviewed and is noncontributory.     Physical Examination   Visit Vitals    /78 (BP 1 Location: Right arm, BP Patient Position: At rest)    Pulse 94    Temp 97.7 °F (36.5 °C)    Resp 20    Wt 57.5 kg (126 lb 12.8 oz)    SpO2 95%    BMI 23.19 kg/m2          O2 Device: Room air    General:  Alert, cooperative, no distress   Head:  Normocephalic, without obvious abnormality, atraumatic   Eyes:  Conjunctivae/corneas clear. PERRL, dysconjugate gaze. Left side peripheral vision loss. E/N/M/T: Nares normal. No nasal drainage. Lips and tongue normal   Teeth and gums normal   Neck: Normal appearance and movements, symmetrical, trachea midline   Lungs:   Symmetrical chest expansion and respiratory effort  Clear to auscultation bilaterally       Heart:  Regular rhythm   Sounds normal; no murmur, click, rub or gallop   Abdomen:   Soft, no tenderness  Bowel sounds normal       Extremities: Extremities normal, atraumatic  No cyanosis or edema   Pulses 2+ and symmetric all extremities       Musculo-      skeletal: Normal symmetry, ROM, strength and tone   Neuro: Muscle strength 5/5 UE + LE   equal, strong  Gait steady   Speech is clear    Psych: Alert, oriented x3  Normal affect, judgement and insight         Data Review    EKG:  normal EKG, normal sinus rhythm, unchanged from previous tracings. 24 Hour Results:  Recent Results (from the past 24 hour(s))   CBC WITH AUTOMATED DIFF    Collection Time: 10/04/17  7:05 PM   Result Value Ref Range    WBC 5.8 3.6 - 11.0 K/uL    RBC 4.34 3.80 - 5.20 M/uL    HGB 13.2 11.5 - 16.0 g/dL    HCT 36.5 35.0 - 47.0 %    MCV 84.1 80.0 - 99.0 FL    MCH 30.4 26.0 - 34.0 PG    MCHC 36.2 30.0 - 36.5 g/dL    RDW 12.0 11.5 - 14.5 %    PLATELET 952 829 - 773 K/uL    NEUTROPHILS 68 32 - 75 %    LYMPHOCYTES 23 12 - 49 %    MONOCYTES 7 5 - 13 %    EOSINOPHILS 2 0 - 7 %    BASOPHILS 0 0 - 1 %    ABS. NEUTROPHILS 3.9 1.8 - 8.0 K/UL    ABS. LYMPHOCYTES 1.4 0.8 - 3.5 K/UL    ABS. MONOCYTES 0.4 0.0 - 1.0 K/UL    ABS. EOSINOPHILS 0.1 0.0 - 0.4 K/UL    ABS.  BASOPHILS 0.0 0.0 - 0.1 K/UL   METABOLIC PANEL, BASIC    Collection Time: 10/04/17  7:05 PM   Result Value Ref Range    Sodium 122 (L) 136 - 145 mmol/L    Potassium 3.3 (L) 3.5 - 5.1 mmol/L    Chloride 88 (L) 97 - 108 mmol/L    CO2 22 21 - 32 mmol/L    Anion gap 12 5 - 15 mmol/L    Glucose 149 (H) 65 - 100 mg/dL    BUN 13 6 - 20 MG/DL    Creatinine 0.81 0.55 - 1.02 MG/DL    BUN/Creatinine ratio 16 12 - 20      GFR est AA >60 >60 ml/min/1.73m2    GFR est non-AA >60 >60 ml/min/1.73m2    Calcium 8.6 8.5 - 10.1 MG/DL   PROTHROMBIN TIME + INR    Collection Time: 10/04/17  7:05 PM   Result Value Ref Range    INR 1.0 0.9 - 1.1      Prothrombin time 10.4 9.0 - 11.1 sec   CORTISOL, PM    Collection Time: 10/04/17  7:12 PM   Result Value Ref Range    Cortisol, p.m. 6.9 3.1 - 16.7 ug/dL   OSMOLALITY, SERUM/PLASMA    Collection Time: 10/05/17  8:47 AM   Result Value Ref Range    Osmolality, serum/plasma 258 mOsm/kg H2O   SODIUM    Collection Time: 10/05/17  8:47 AM   Result Value Ref Range    Sodium 121 (L) 136 - 145 mmol/L     Recent Labs      10/04/17   1905   WBC  5.8   HGB  13.2   HCT  36.5   PLT  182     Recent Labs      10/05/17   0847  10/04/17   1905   NA  121*  122*   K   --   3.3*   CL   --   88*   CO2   --   22   GLU   --   149*   BUN   --   13   CREA   --   0.81   CA   --   8.6   INR   --   1.0       Imaging  MRI of brain without contrast on 10/3/17: Large pituitary mass containing a fluid/fluid level suggesting possible old  hemorrhage into a pituitary macroadenoma. While Rathke cleft cyst may also be in the differential, it is less likely and not favored. See dimensions and details above. Microvascular ischemic and other age-related change.     MRA of brain without contrast on 10/3/17: No flow limiting stenosis or intracranial aneurysm      Assessment and Plan   Active Problems:    Pituitary adenoma ([facility] [address] (10/4/2017)      1.  Pituitary mass   - Plan for OR for removal on 10/6/17 with Dr. Sebastian Hernandez   - Pain control with PRN tylenol and norco   - Neuro checks - NPO at midnight   - Cortisol level 6.9, continue solu-cortef 100mg BID   - Prolactin level pending   - CT of head with brain lab today   - Obtain consent     2. Hypertension  - Continue norvasc daily  - Cardiac monitor   - SBP goal < 160  - PRN Labetalol and hydralazine      3.  Hyponatremia  - Na+ 122 yesterday, 121 today, previously 139 back in September   - Repeat labs @ noon  - Awaiting urine studies   - 1200 cc fluid restriction   - Nephrology consult, following    Diet: Regular, NPO at midnight tonight   Activity: Up with assist  DVT prophylaxis: SCDs  Isolation precautions: None  Consultations: Nephrology   Anticipated disposition: TBD    Plan discussed with patient and  at bedside          Signed by: Hilary White  [facility]-NP Student    October 5, 2017 at 11:25 AM

## 2017-10-05 NOTE — PROGRESS NOTES
Urine testing in favor of SIADH;will give one dose tolvaptan and stop fluid restriciton;serial sodium monitoring;patient aware of small risk of overcorrecting sodium with tolvaptan

## 2017-10-05 NOTE — PROGRESS NOTES
Problem: Falls - Risk of  Goal: *Absence of Falls  Document Erasmo Fall Risk and appropriate interventions in the flowsheet.    Outcome: Progressing Towards Goal  Fall Risk Interventions:              Medication Interventions: Teach patient to arise slowly, Evaluate medications/consider consulting pharmacy, Patient to call before getting OOB

## 2017-10-05 NOTE — ROUTINE PROCESS
Bedside shift change report given to [facility] (oncoming nurse) by Ekta Hooker (offgoing nurse). Report included the following information SBAR, Kardex, MAR, Accordion and Cardiac Rhythm NSR.

## 2017-10-05 NOTE — H&P
History & Physical    Date of admission: 10/4/2017    Patient name: Paul Sims  MRN: [id number]  YOB: 1945  Age: 67 y.o. Primary care provider:  Melvina Ndiaye MD     Source of Information: patient, medical records                              Chief complaint: pituitary mass     History of present illness  Giovanna Sierra is a 67 y.o. female who presents with a pituitary mass. The patient  Has not been feeling well for the past year. She states she has been feeling tired and not her usual self for about the past 3 years now, more specifically the last 18 months. She saw an endocrinologist who diagnosed her with low vitamin D levels. She was also found to have enlarged parathyroid glands. She saw Dr. Tony Harmon who removed 2 enlarged parathyroid glands in 12/2016. The patient states after this surgery, she felt a lot better for about 2 months. Then she got a respiratory virus and never bounced back. She states 2 weeks ago she developed headaches. She saw her endocrinologist who ordered a head CT. The patient was found to have a pituitary cyst. Only axial images were visible on this scan. She was referred to Dr. Amisha Garcia who ordered an MRI. 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 She was directly admitted to the hospital last night with plans for surgery tomorrow. Her admission labs did reveal a sodium level of 122. This was repeated and found to be 121. The patient's sodium level on 09/18/17 was 139 and she has no evidence of hyponatremia on prior labs. She did vomit yesterday. She denies recent change in weight, abnormal hair growth, change in ring size or shoe size. Her headache has worsened over the past 4 days. She has never had sinus surgery or nasal surgery.     Past Medical History:   Diagnosis Date    Adverse effect of anesthesia     \"TAKES A LONG TIME FOR ME TO COME OUT OF IT\"    Arthritis     High cholesterol  Hypertension     Osteopenia     Thyroid disease     HAVING SURGERY ON 15      Past Surgical History:   Procedure Laterality Date    HX OTHER SURGICAL  2006    FACE LIFT     HX AMANDA AND BSO  1980s    HX TONSILLECTOMY  age 9     Prior to Admission medications    Medication Sig Start Date End Date Taking? Authorizing Provider   docusate sodium (COLACE) 50 mg capsule Take 2 Caps by mouth two (2) times daily as needed for Constipation. 12/3/16  Yes Chelsea Blake MD   amLODIPine (NORVASC) 5 mg tablet Take 1 Tab by mouth daily. 16  Yes Margarita Shore MD   cholecalciferol, vitamin D3, 2,000 unit tab Take  by mouth. Yes Historical Provider     No Known Allergies   Family History   Problem Relation Age of Onset    Heart Attack Mother     No Known Problems Father       in the war    Heart Attack Maternal Aunt     Cancer Maternal Aunt      bone cancer    Anesth Problems Neg Hx       Family history reviewed and non-contributory. Social history  Patient resides  X  Independently      With family care      Assisted living      SNF    Ambulates  X  Independently      With cane       Assisted walker         Alcohol history   X  None     Social     Chronic   Smoking history  X  None     Former smoker     Current smoker     History   Smoking Status    Never Smoker   Smokeless Tobacco    Never Used       Code status  X  Full code     DNR/DNI        Code status discussed with the patient/caregivers. Full Code    Review of systems  The patient denies any fever, chills, chest pain, cough, congestion, recent illness, palpitations, or dysuria. A comprehensive review of systems was negative except for that written in the History of Present Illness. The remainder of the review of systems was reviewed and is noncontributory.     Physical Examination   Visit Vitals    /66    Pulse 73    Temp 97.7 °F (36.5 °C)    Resp 16    Wt 57.5 kg (126 lb 12.8 oz)    SpO2 95%    BMI 23.19 kg/m2          O2 Device: Room air    NAD. A&Ox3. Follows commands. Speech clear. Affect normal.  PERRL. Dysconjugate gaze when looking to the right. Full visual fields when tested to confrontation. Face symmetric. Palate symmetric. Tongue midline. NAZARIO. Strength 5/5 in UE and LE BL. Negative drift. Gait based and steady  Heart RRR  Lungs CTA BL  Abd soft, NT. Normal bowel sounds  Ext no edema    MRI brain with and without contrast on 10/03/17 shows large pituitary mass containing a fluid/fluid level suggesting possible old hemorrhage into a pituitary macroadenoma. While Rathke cleft cyst may also be in the differential, it is less likely and not favored. See dimensions and details above. Microvascular ischemic and other age-related change. Data Review    24 Hour Results:  Recent Results (from the past 24 hour(s))   CBC WITH AUTOMATED DIFF    Collection Time: 10/04/17  7:05 PM   Result Value Ref Range    WBC 5.8 3.6 - 11.0 K/uL    RBC 4.34 3.80 - 5.20 M/uL    HGB 13.2 11.5 - 16.0 g/dL    HCT 36.5 35.0 - 47.0 %    MCV 84.1 80.0 - 99.0 FL    MCH 30.4 26.0 - 34.0 PG    MCHC 36.2 30.0 - 36.5 g/dL    RDW 12.0 11.5 - 14.5 %    PLATELET 501 241 - 450 K/uL    NEUTROPHILS 68 32 - 75 %    LYMPHOCYTES 23 12 - 49 %    MONOCYTES 7 5 - 13 %    EOSINOPHILS 2 0 - 7 %    BASOPHILS 0 0 - 1 %    ABS. NEUTROPHILS 3.9 1.8 - 8.0 K/UL    ABS. LYMPHOCYTES 1.4 0.8 - 3.5 K/UL    ABS. MONOCYTES 0.4 0.0 - 1.0 K/UL    ABS. EOSINOPHILS 0.1 0.0 - 0.4 K/UL    ABS.  BASOPHILS 0.0 0.0 - 0.1 K/UL   METABOLIC PANEL, BASIC    Collection Time: 10/04/17  7:05 PM   Result Value Ref Range    Sodium 122 (L) 136 - 145 mmol/L    Potassium 3.3 (L) 3.5 - 5.1 mmol/L    Chloride 88 (L) 97 - 108 mmol/L    CO2 22 21 - 32 mmol/L    Anion gap 12 5 - 15 mmol/L    Glucose 149 (H) 65 - 100 mg/dL    BUN 13 6 - 20 MG/DL    Creatinine 0.81 0.55 - 1.02 MG/DL    BUN/Creatinine ratio 16 12 - 20      GFR est AA >60 >60 ml/min/1.73m2    GFR est non-AA >60 >60 ml/min/1.73m2    Calcium 8.6 8.5 - 10.1 MG/DL   PROTHROMBIN TIME + INR    Collection Time: 10/04/17  7:05 PM   Result Value Ref Range    INR 1.0 0.9 - 1.1      Prothrombin time 10.4 9.0 - 11.1 sec   CORTISOL, PM    Collection Time: 10/04/17  7:12 PM   Result Value Ref Range    Cortisol, p.m. 6.9 3.1 - 16.7 ug/dL   OSMOLALITY, SERUM/PLASMA    Collection Time: 10/05/17  8:47 AM   Result Value Ref Range    Osmolality, serum/plasma 258 mOsm/kg H2O   SODIUM    Collection Time: 10/05/17  8:47 AM   Result Value Ref Range    Sodium 121 (L) 136 - 145 mmol/L     Recent Labs      10/04/17   1905   WBC  5.8   HGB  13.2   HCT  36.5   PLT  182     Recent Labs      10/05/17   0847  10/04/17   1905   NA  121*  122*   K   --   3.3*   CL   --   88*   CO2   --   22   GLU   --   149*   BUN   --   13   CREA   --   0.81   CA   --   8.6   INR   --   1.0         Assessment and Plan   Active Problems:    Pituitary adenoma ([facility] [address] (10/4/2017)    1. Pituitary cyst         - Plan for OR in am for TSS with Dr. Chula Fischer and Dr. Bridget Saldaña         - Endocrine labs         - CT w/ contrast brain lab protocol         - NPO tonight         - Cont Solu-Cortef  2. Optic chiasm compression          - due to #1          - plans as above  3. Hyponatremia          - urine osmo, urine sodium          - renal consult          - correction of sodium will depend upon if we are able to proceed with surgery in am  4. HTN          - cont Norvasc from home    Plan d/w Dr. Chula Fischer. He will be by to see the patient later this afternoon. Also discussed plan with Dr. Poly Del Angel.      Diet: Regular now with NPO after midnight  Activity: up ad alfie  DVT prophylaxis: SCDs  Isolation precautions: None  Consultations: Renal  Anticipated disposition: home         Signed by: Adam Tay NP     October 5, 2017 at 10:44 AM       I reviewed the MRI which shows a cystic pituitary lesion consistent with an infarcted gland that bled at some point  She had severe headache about 6 weeks ago but she did not seek medical attention until earlier this week when she had a persistent headache that was not improving, she was chronically fatigued and generally felt ill. Found to have a decreased Na level and labile BP. Plan removal of the tumor via a transsphenoidal approach in conjunction with ENT.  See note of 10/5/17 for current improved status

## 2017-10-05 NOTE — PROGRESS NOTES
Problem: Falls - Risk of  Goal: *Absence of Falls  Document Erasmo Fall Risk and appropriate interventions in the flowsheet.    Outcome: Progressing Towards Goal  Fall Risk Interventions:              Medication Interventions: Teach patient to arise slowly

## 2017-10-05 NOTE — PROGRESS NOTES
Bedside and Verbal shift change report given to SEN Bernal by Pan Hernandez RN. Report included the following information SBAR, Kardex, Intake/Output, Recent Results and Cardiac Rhythm NSR.

## 2017-10-05 NOTE — PROGRESS NOTES
Problem: Discharge Planning  Goal: *Discharge to safe environment  Outcome: Progressing Towards Goal  To be determined post op.

## 2017-10-06 ENCOUNTER — ANESTHESIA EVENT (OUTPATIENT)
Dept: SURGERY | Age: 72
DRG: 614 | End: 2017-10-06
Payer: MEDICARE

## 2017-10-06 ENCOUNTER — ANESTHESIA (OUTPATIENT)
Dept: SURGERY | Age: 72
DRG: 614 | End: 2017-10-06
Payer: MEDICARE

## 2017-10-06 LAB
ABO + RH BLD: NORMAL
ANION GAP SERPL CALC-SCNC: 10 MMOL/L (ref 5–15)
ANION GAP SERPL CALC-SCNC: 10 MMOL/L (ref 5–15)
ANION GAP SERPL CALC-SCNC: 16 MMOL/L (ref 5–15)
ANION GAP SERPL CALC-SCNC: 8 MMOL/L (ref 5–15)
BLOOD GROUP ANTIBODIES SERPL: NORMAL
BUN SERPL-MCNC: 16 MG/DL (ref 6–20)
BUN SERPL-MCNC: 18 MG/DL (ref 6–20)
BUN SERPL-MCNC: 18 MG/DL (ref 6–20)
BUN SERPL-MCNC: 19 MG/DL (ref 6–20)
BUN/CREAT SERPL: 15 (ref 12–20)
BUN/CREAT SERPL: 17 (ref 12–20)
CALCIUM SERPL-MCNC: 8.3 MG/DL (ref 8.5–10.1)
CALCIUM SERPL-MCNC: 8.6 MG/DL (ref 8.5–10.1)
CHLORIDE SERPL-SCNC: 101 MMOL/L (ref 97–108)
CHLORIDE SERPL-SCNC: 102 MMOL/L (ref 97–108)
CHLORIDE SERPL-SCNC: 92 MMOL/L (ref 97–108)
CHLORIDE SERPL-SCNC: 98 MMOL/L (ref 97–108)
CO2 SERPL-SCNC: 21 MMOL/L (ref 21–32)
CO2 SERPL-SCNC: 23 MMOL/L (ref 21–32)
CO2 SERPL-SCNC: 25 MMOL/L (ref 21–32)
CO2 SERPL-SCNC: 25 MMOL/L (ref 21–32)
CREAT SERPL-MCNC: 1.04 MG/DL (ref 0.55–1.02)
CREAT SERPL-MCNC: 1.07 MG/DL (ref 0.55–1.02)
CREAT SERPL-MCNC: 1.1 MG/DL (ref 0.55–1.02)
CREAT SERPL-MCNC: 1.15 MG/DL (ref 0.55–1.02)
GH SERPL-MCNC: 0.4 NG/ML (ref 0–10)
GLUCOSE SERPL-MCNC: 117 MG/DL (ref 65–100)
GLUCOSE SERPL-MCNC: 131 MG/DL (ref 65–100)
GLUCOSE SERPL-MCNC: 140 MG/DL (ref 65–100)
GLUCOSE SERPL-MCNC: 159 MG/DL (ref 65–100)
OSMOLALITY UR: 284 MOSM/KG H2O
POTASSIUM SERPL-SCNC: 3.3 MMOL/L (ref 3.5–5.1)
POTASSIUM SERPL-SCNC: 3.5 MMOL/L (ref 3.5–5.1)
POTASSIUM SERPL-SCNC: 3.5 MMOL/L (ref 3.5–5.1)
POTASSIUM SERPL-SCNC: 3.6 MMOL/L (ref 3.5–5.1)
PROLACTIN SERPL-MCNC: 12.9 NG/ML
SODIUM SERPL-SCNC: 128 MMOL/L (ref 136–145)
SODIUM SERPL-SCNC: 129 MMOL/L (ref 136–145)
SODIUM SERPL-SCNC: 133 MMOL/L (ref 136–145)
SODIUM SERPL-SCNC: 134 MMOL/L (ref 136–145)
SODIUM SERPL-SCNC: 135 MMOL/L (ref 136–145)
SPECIMEN EXP DATE BLD: NORMAL

## 2017-10-06 PROCEDURE — 77030008771 HC TU NG SALEM SUMP -A: Performed by: NURSE ANESTHETIST, CERTIFIED REGISTERED

## 2017-10-06 PROCEDURE — 77030026438 HC STYL ET INTUB CARD -A: Performed by: NURSE ANESTHETIST, CERTIFIED REGISTERED

## 2017-10-06 PROCEDURE — 77030011267 HC ELECTRD BLD COVD -A: Performed by: NEUROLOGICAL SURGERY

## 2017-10-06 PROCEDURE — 74011000272 HC RX REV CODE- 272: Performed by: NEUROLOGICAL SURGERY

## 2017-10-06 PROCEDURE — 36415 COLL VENOUS BLD VENIPUNCTURE: CPT | Performed by: INTERNAL MEDICINE

## 2017-10-06 PROCEDURE — 77030011645 HC PK NSL DOYLE MEDT -B: Performed by: NEUROLOGICAL SURGERY

## 2017-10-06 PROCEDURE — 77030010507 HC ADH SKN DERMBND J&J -B: Performed by: NEUROLOGICAL SURGERY

## 2017-10-06 PROCEDURE — 88342 IMHCHEM/IMCYTCHM 1ST ANTB: CPT | Performed by: NEUROLOGICAL SURGERY

## 2017-10-06 PROCEDURE — 77030008684 HC TU ET CUF COVD -B: Performed by: NURSE ANESTHETIST, CERTIFIED REGISTERED

## 2017-10-06 PROCEDURE — 74011250636 HC RX REV CODE- 250/636: Performed by: NEUROLOGICAL SURGERY

## 2017-10-06 PROCEDURE — 77030003029 HC SUT VCRL J&J -B: Performed by: NEUROLOGICAL SURGERY

## 2017-10-06 PROCEDURE — 88313 SPECIAL STAINS GROUP 2: CPT | Performed by: NEUROLOGICAL SURGERY

## 2017-10-06 PROCEDURE — 76010000172 HC OR TIME 2.5 TO 3 HR INTENSV-TIER 1: Performed by: NEUROLOGICAL SURGERY

## 2017-10-06 PROCEDURE — 77030012602 HC SPNG PTTY NEUR J&J -B: Performed by: NEUROLOGICAL SURGERY

## 2017-10-06 PROCEDURE — 74011250637 HC RX REV CODE- 250/637: Performed by: OTOLARYNGOLOGY

## 2017-10-06 PROCEDURE — 65610000006 HC RM INTENSIVE CARE

## 2017-10-06 PROCEDURE — 77030013079 HC BLNKT BAIR HGGR 3M -A: Performed by: NURSE ANESTHETIST, CERTIFIED REGISTERED

## 2017-10-06 PROCEDURE — 77030018673: Performed by: NEUROLOGICAL SURGERY

## 2017-10-06 PROCEDURE — 74011000250 HC RX REV CODE- 250: Performed by: NEUROLOGICAL SURGERY

## 2017-10-06 PROCEDURE — 77030032490 HC SLV COMPR SCD KNE COVD -B: Performed by: NEUROLOGICAL SURGERY

## 2017-10-06 PROCEDURE — 77030034850: Performed by: NEUROLOGICAL SURGERY

## 2017-10-06 PROCEDURE — 80048 BASIC METABOLIC PNL TOTAL CA: CPT | Performed by: INTERNAL MEDICINE

## 2017-10-06 PROCEDURE — 74011250636 HC RX REV CODE- 250/636

## 2017-10-06 PROCEDURE — 77030002933 HC SUT MCRYL J&J -A: Performed by: NEUROLOGICAL SURGERY

## 2017-10-06 PROCEDURE — 74011250636 HC RX REV CODE- 250/636: Performed by: NURSE PRACTITIONER

## 2017-10-06 PROCEDURE — 77030029099 HC BN WAX SSPC -A: Performed by: NEUROLOGICAL SURGERY

## 2017-10-06 PROCEDURE — 77030008467 HC STPLR SKN COVD -B: Performed by: NEUROLOGICAL SURGERY

## 2017-10-06 PROCEDURE — 86900 BLOOD TYPING SEROLOGIC ABO: CPT | Performed by: ANESTHESIOLOGY

## 2017-10-06 PROCEDURE — 84295 ASSAY OF SERUM SODIUM: CPT | Performed by: INTERNAL MEDICINE

## 2017-10-06 PROCEDURE — 88112 CYTOPATH CELL ENHANCE TECH: CPT | Performed by: NEUROLOGICAL SURGERY

## 2017-10-06 PROCEDURE — 76060000036 HC ANESTHESIA 2.5 TO 3 HR: Performed by: NEUROLOGICAL SURGERY

## 2017-10-06 PROCEDURE — 77030014650 HC SEAL MTRX FLOSEL BAXT -C: Performed by: NEUROLOGICAL SURGERY

## 2017-10-06 PROCEDURE — 74011250636 HC RX REV CODE- 250/636: Performed by: INTERNAL MEDICINE

## 2017-10-06 PROCEDURE — 74011000250 HC RX REV CODE- 250: Performed by: NURSE PRACTITIONER

## 2017-10-06 PROCEDURE — 74011250637 HC RX REV CODE- 250/637: Performed by: NEUROLOGICAL SURGERY

## 2017-10-06 PROCEDURE — 74011000250 HC RX REV CODE- 250

## 2017-10-06 PROCEDURE — 88305 TISSUE EXAM BY PATHOLOGIST: CPT | Performed by: NEUROLOGICAL SURGERY

## 2017-10-06 PROCEDURE — 74011000258 HC RX REV CODE- 258: Performed by: INTERNAL MEDICINE

## 2017-10-06 PROCEDURE — 83935 ASSAY OF URINE OSMOLALITY: CPT | Performed by: INTERNAL MEDICINE

## 2017-10-06 PROCEDURE — 74011250637 HC RX REV CODE- 250/637: Performed by: NURSE PRACTITIONER

## 2017-10-06 PROCEDURE — 77030002888 HC SUT CHRMC J&J -A: Performed by: NEUROLOGICAL SURGERY

## 2017-10-06 PROCEDURE — 77030011640 HC PAD GRND REM COVD -A: Performed by: NEUROLOGICAL SURGERY

## 2017-10-06 PROCEDURE — 76210000016 HC OR PH I REC 1 TO 1.5 HR: Performed by: NEUROLOGICAL SURGERY

## 2017-10-06 PROCEDURE — 77030013137 HC MRK XR CRAN BUSA -A: Performed by: NEUROLOGICAL SURGERY

## 2017-10-06 PROCEDURE — 74011250636 HC RX REV CODE- 250/636: Performed by: ANESTHESIOLOGY

## 2017-10-06 PROCEDURE — 77030018846 HC SOL IRR STRL H20 ICUM -A: Performed by: NEUROLOGICAL SURGERY

## 2017-10-06 RX ORDER — SODIUM CHLORIDE, SODIUM LACTATE, POTASSIUM CHLORIDE, CALCIUM CHLORIDE 600; 310; 30; 20 MG/100ML; MG/100ML; MG/100ML; MG/100ML
125 INJECTION, SOLUTION INTRAVENOUS CONTINUOUS
Status: DISCONTINUED | OUTPATIENT
Start: 2017-10-06 | End: 2017-10-06 | Stop reason: HOSPADM

## 2017-10-06 RX ORDER — ONDANSETRON 2 MG/ML
4 INJECTION INTRAMUSCULAR; INTRAVENOUS
Status: DISCONTINUED | OUTPATIENT
Start: 2017-10-06 | End: 2017-10-10 | Stop reason: HOSPADM

## 2017-10-06 RX ORDER — SODIUM CHLORIDE 0.9 % (FLUSH) 0.9 %
5-10 SYRINGE (ML) INJECTION AS NEEDED
Status: DISCONTINUED | OUTPATIENT
Start: 2017-10-06 | End: 2017-10-06 | Stop reason: HOSPADM

## 2017-10-06 RX ORDER — SODIUM CHLORIDE 9 MG/ML
25 INJECTION, SOLUTION INTRAVENOUS CONTINUOUS
Status: DISCONTINUED | OUTPATIENT
Start: 2017-10-06 | End: 2017-10-06 | Stop reason: HOSPADM

## 2017-10-06 RX ORDER — HYDROMORPHONE HYDROCHLORIDE 1 MG/ML
INJECTION, SOLUTION INTRAMUSCULAR; INTRAVENOUS; SUBCUTANEOUS AS NEEDED
Status: DISCONTINUED | OUTPATIENT
Start: 2017-10-06 | End: 2017-10-06 | Stop reason: HOSPADM

## 2017-10-06 RX ORDER — SODIUM CHLORIDE, SODIUM LACTATE, POTASSIUM CHLORIDE, CALCIUM CHLORIDE 600; 310; 30; 20 MG/100ML; MG/100ML; MG/100ML; MG/100ML
INJECTION, SOLUTION INTRAVENOUS
Status: DISCONTINUED | OUTPATIENT
Start: 2017-10-06 | End: 2017-10-06 | Stop reason: HOSPADM

## 2017-10-06 RX ORDER — FAMOTIDINE 20 MG/1
20 TABLET, FILM COATED ORAL EVERY 12 HOURS
Status: DISCONTINUED | OUTPATIENT
Start: 2017-10-06 | End: 2017-10-06 | Stop reason: SDUPTHER

## 2017-10-06 RX ORDER — HYDROCORTISONE SODIUM SUCCINATE 100 MG/2ML
100 INJECTION, POWDER, FOR SOLUTION INTRAMUSCULAR; INTRAVENOUS EVERY 12 HOURS
Status: DISCONTINUED | OUTPATIENT
Start: 2017-10-06 | End: 2017-10-06 | Stop reason: SDUPTHER

## 2017-10-06 RX ORDER — PROPOFOL 10 MG/ML
INJECTION, EMULSION INTRAVENOUS AS NEEDED
Status: DISCONTINUED | OUTPATIENT
Start: 2017-10-06 | End: 2017-10-06 | Stop reason: HOSPADM

## 2017-10-06 RX ORDER — LIDOCAINE HYDROCHLORIDE 20 MG/ML
INJECTION, SOLUTION EPIDURAL; INFILTRATION; INTRACAUDAL; PERINEURAL AS NEEDED
Status: DISCONTINUED | OUTPATIENT
Start: 2017-10-06 | End: 2017-10-06 | Stop reason: HOSPADM

## 2017-10-06 RX ORDER — SODIUM CHLORIDE, SODIUM LACTATE, POTASSIUM CHLORIDE, CALCIUM CHLORIDE 600; 310; 30; 20 MG/100ML; MG/100ML; MG/100ML; MG/100ML
125 INJECTION, SOLUTION INTRAVENOUS CONTINUOUS
Status: DISCONTINUED | OUTPATIENT
Start: 2017-10-06 | End: 2017-10-06

## 2017-10-06 RX ORDER — ONDANSETRON 2 MG/ML
4 INJECTION INTRAMUSCULAR; INTRAVENOUS AS NEEDED
Status: DISCONTINUED | OUTPATIENT
Start: 2017-10-06 | End: 2017-10-06 | Stop reason: HOSPADM

## 2017-10-06 RX ORDER — SODIUM CHLORIDE 0.9 % (FLUSH) 0.9 %
5-10 SYRINGE (ML) INJECTION EVERY 8 HOURS
Status: DISCONTINUED | OUTPATIENT
Start: 2017-10-06 | End: 2017-10-10 | Stop reason: HOSPADM

## 2017-10-06 RX ORDER — SODIUM CHLORIDE 450 MG/100ML
75 INJECTION, SOLUTION INTRAVENOUS CONTINUOUS
Status: DISCONTINUED | OUTPATIENT
Start: 2017-10-06 | End: 2017-10-07

## 2017-10-06 RX ORDER — NEOSTIGMINE METHYLSULFATE 1 MG/ML
INJECTION INTRAVENOUS AS NEEDED
Status: DISCONTINUED | OUTPATIENT
Start: 2017-10-06 | End: 2017-10-06 | Stop reason: HOSPADM

## 2017-10-06 RX ORDER — SODIUM CHLORIDE 0.9 % (FLUSH) 0.9 %
5-10 SYRINGE (ML) INJECTION EVERY 8 HOURS
Status: DISCONTINUED | OUTPATIENT
Start: 2017-10-06 | End: 2017-10-06 | Stop reason: HOSPADM

## 2017-10-06 RX ORDER — SODIUM CHLORIDE 9 MG/ML
100 INJECTION, SOLUTION INTRAVENOUS CONTINUOUS
Status: DISCONTINUED | OUTPATIENT
Start: 2017-10-06 | End: 2017-10-06

## 2017-10-06 RX ORDER — HYDROMORPHONE HYDROCHLORIDE 1 MG/ML
0.2 INJECTION, SOLUTION INTRAMUSCULAR; INTRAVENOUS; SUBCUTANEOUS
Status: DISCONTINUED | OUTPATIENT
Start: 2017-10-06 | End: 2017-10-06 | Stop reason: HOSPADM

## 2017-10-06 RX ORDER — MIDAZOLAM HYDROCHLORIDE 1 MG/ML
0.5 INJECTION, SOLUTION INTRAMUSCULAR; INTRAVENOUS
Status: DISCONTINUED | OUTPATIENT
Start: 2017-10-06 | End: 2017-10-06 | Stop reason: HOSPADM

## 2017-10-06 RX ORDER — LIDOCAINE HYDROCHLORIDE 10 MG/ML
0.1 INJECTION, SOLUTION EPIDURAL; INFILTRATION; INTRACAUDAL; PERINEURAL AS NEEDED
Status: DISCONTINUED | OUTPATIENT
Start: 2017-10-06 | End: 2017-10-06 | Stop reason: HOSPADM

## 2017-10-06 RX ORDER — BACITRACIN ZINC 500 UNIT/G
OINTMENT (GRAM) TOPICAL AS NEEDED
Status: DISCONTINUED | OUTPATIENT
Start: 2017-10-06 | End: 2017-10-06 | Stop reason: HOSPADM

## 2017-10-06 RX ORDER — FENTANYL CITRATE 50 UG/ML
25 INJECTION, SOLUTION INTRAMUSCULAR; INTRAVENOUS
Status: DISCONTINUED | OUTPATIENT
Start: 2017-10-06 | End: 2017-10-06 | Stop reason: HOSPADM

## 2017-10-06 RX ORDER — PHENYLEPHRINE HCL IN 0.9% NACL 0.4MG/10ML
SYRINGE (ML) INTRAVENOUS AS NEEDED
Status: DISCONTINUED | OUTPATIENT
Start: 2017-10-06 | End: 2017-10-06 | Stop reason: HOSPADM

## 2017-10-06 RX ORDER — MORPHINE SULFATE 2 MG/ML
2 INJECTION, SOLUTION INTRAMUSCULAR; INTRAVENOUS
Status: DISCONTINUED | OUTPATIENT
Start: 2017-10-06 | End: 2017-10-10 | Stop reason: HOSPADM

## 2017-10-06 RX ORDER — ROCURONIUM BROMIDE 10 MG/ML
INJECTION, SOLUTION INTRAVENOUS AS NEEDED
Status: DISCONTINUED | OUTPATIENT
Start: 2017-10-06 | End: 2017-10-06 | Stop reason: HOSPADM

## 2017-10-06 RX ORDER — SODIUM CHLORIDE 0.9 % (FLUSH) 0.9 %
5-10 SYRINGE (ML) INJECTION AS NEEDED
Status: DISCONTINUED | OUTPATIENT
Start: 2017-10-06 | End: 2017-10-10 | Stop reason: HOSPADM

## 2017-10-06 RX ORDER — CEFAZOLIN SODIUM IN 0.9 % NACL 2 G/50 ML
2 INTRAVENOUS SOLUTION, PIGGYBACK (ML) INTRAVENOUS EVERY 8 HOURS
Status: COMPLETED | OUTPATIENT
Start: 2017-10-06 | End: 2017-10-09

## 2017-10-06 RX ORDER — POTASSIUM CHLORIDE 14.9 MG/ML
10 INJECTION INTRAVENOUS
Status: COMPLETED | OUTPATIENT
Start: 2017-10-06 | End: 2017-10-09

## 2017-10-06 RX ORDER — DIPHENHYDRAMINE HYDROCHLORIDE 50 MG/ML
12.5 INJECTION, SOLUTION INTRAMUSCULAR; INTRAVENOUS AS NEEDED
Status: DISCONTINUED | OUTPATIENT
Start: 2017-10-06 | End: 2017-10-06 | Stop reason: HOSPADM

## 2017-10-06 RX ORDER — GLYCOPYRROLATE 0.2 MG/ML
INJECTION INTRAMUSCULAR; INTRAVENOUS AS NEEDED
Status: DISCONTINUED | OUTPATIENT
Start: 2017-10-06 | End: 2017-10-06 | Stop reason: HOSPADM

## 2017-10-06 RX ORDER — ACETAMINOPHEN 325 MG/1
650 TABLET ORAL
Status: DISCONTINUED | OUTPATIENT
Start: 2017-10-06 | End: 2017-10-10 | Stop reason: HOSPADM

## 2017-10-06 RX ORDER — CEFAZOLIN SODIUM 1 G/3ML
INJECTION, POWDER, FOR SOLUTION INTRAMUSCULAR; INTRAVENOUS AS NEEDED
Status: DISCONTINUED | OUTPATIENT
Start: 2017-10-06 | End: 2017-10-06 | Stop reason: HOSPADM

## 2017-10-06 RX ORDER — FENTANYL CITRATE 50 UG/ML
INJECTION, SOLUTION INTRAMUSCULAR; INTRAVENOUS AS NEEDED
Status: DISCONTINUED | OUTPATIENT
Start: 2017-10-06 | End: 2017-10-06 | Stop reason: HOSPADM

## 2017-10-06 RX ORDER — OXYCODONE AND ACETAMINOPHEN 5; 325 MG/1; MG/1
1 TABLET ORAL AS NEEDED
Status: DISCONTINUED | OUTPATIENT
Start: 2017-10-06 | End: 2017-10-06 | Stop reason: HOSPADM

## 2017-10-06 RX ORDER — DESMOPRESSIN ACETATE 4 UG/ML
2 INJECTION, SOLUTION INTRAVENOUS; SUBCUTANEOUS ONCE
Status: COMPLETED | OUTPATIENT
Start: 2017-10-06 | End: 2017-10-06

## 2017-10-06 RX ORDER — NALOXONE HYDROCHLORIDE 0.4 MG/ML
0.4 INJECTION, SOLUTION INTRAMUSCULAR; INTRAVENOUS; SUBCUTANEOUS AS NEEDED
Status: DISCONTINUED | OUTPATIENT
Start: 2017-10-06 | End: 2017-10-10 | Stop reason: HOSPADM

## 2017-10-06 RX ORDER — HYDROCODONE BITARTRATE AND ACETAMINOPHEN 5; 325 MG/1; MG/1
1 TABLET ORAL
Status: DISCONTINUED | OUTPATIENT
Start: 2017-10-06 | End: 2017-10-06 | Stop reason: SDUPTHER

## 2017-10-06 RX ORDER — OXYMETAZOLINE HCL 0.05 %
SPRAY, NON-AEROSOL (ML) NASAL AS NEEDED
Status: DISCONTINUED | OUTPATIENT
Start: 2017-10-06 | End: 2017-10-06 | Stop reason: HOSPADM

## 2017-10-06 RX ORDER — FENTANYL CITRATE 50 UG/ML
50 INJECTION, SOLUTION INTRAMUSCULAR; INTRAVENOUS AS NEEDED
Status: DISCONTINUED | OUTPATIENT
Start: 2017-10-06 | End: 2017-10-06 | Stop reason: HOSPADM

## 2017-10-06 RX ORDER — MIDAZOLAM HYDROCHLORIDE 1 MG/ML
1 INJECTION, SOLUTION INTRAMUSCULAR; INTRAVENOUS AS NEEDED
Status: DISCONTINUED | OUTPATIENT
Start: 2017-10-06 | End: 2017-10-06 | Stop reason: HOSPADM

## 2017-10-06 RX ORDER — MIDAZOLAM HYDROCHLORIDE 1 MG/ML
INJECTION, SOLUTION INTRAMUSCULAR; INTRAVENOUS AS NEEDED
Status: DISCONTINUED | OUTPATIENT
Start: 2017-10-06 | End: 2017-10-06 | Stop reason: HOSPADM

## 2017-10-06 RX ORDER — DOCUSATE SODIUM 100 MG/1
100 CAPSULE, LIQUID FILLED ORAL 2 TIMES DAILY
Status: DISCONTINUED | OUTPATIENT
Start: 2017-10-06 | End: 2017-10-10 | Stop reason: HOSPADM

## 2017-10-06 RX ORDER — SODIUM CHLORIDE 9 MG/ML
250 INJECTION, SOLUTION INTRAVENOUS AS NEEDED
Status: DISCONTINUED | OUTPATIENT
Start: 2017-10-06 | End: 2017-10-10 | Stop reason: HOSPADM

## 2017-10-06 RX ORDER — MORPHINE SULFATE 10 MG/ML
2 INJECTION, SOLUTION INTRAMUSCULAR; INTRAVENOUS
Status: DISCONTINUED | OUTPATIENT
Start: 2017-10-06 | End: 2017-10-06 | Stop reason: HOSPADM

## 2017-10-06 RX ADMIN — SODIUM CHLORIDE 5 MG/HR: 900 INJECTION, SOLUTION INTRAVENOUS at 21:47

## 2017-10-06 RX ADMIN — DOCUSATE SODIUM 100 MG: 100 CAPSULE, LIQUID FILLED ORAL at 18:07

## 2017-10-06 RX ADMIN — POTASSIUM CHLORIDE 10 MEQ: 14.9 INJECTION, SOLUTION INTRAVENOUS at 18:08

## 2017-10-06 RX ADMIN — Medication 120 MCG: at 08:39

## 2017-10-06 RX ADMIN — Medication 120 MCG: at 08:22

## 2017-10-06 RX ADMIN — LIDOCAINE HYDROCHLORIDE 60 MG: 20 INJECTION, SOLUTION EPIDURAL; INFILTRATION; INTRACAUDAL; PERINEURAL at 08:04

## 2017-10-06 RX ADMIN — Medication 120 MCG: at 08:35

## 2017-10-06 RX ADMIN — HYDROCORTISONE SODIUM SUCCINATE 100 MG: 100 INJECTION, POWDER, FOR SOLUTION INTRAMUSCULAR; INTRAVENOUS at 20:47

## 2017-10-06 RX ADMIN — FAMOTIDINE 20 MG: 20 TABLET ORAL at 18:07

## 2017-10-06 RX ADMIN — ONDANSETRON 4 MG: 2 INJECTION INTRAMUSCULAR; INTRAVENOUS at 11:26

## 2017-10-06 RX ADMIN — HYDROMORPHONE HYDROCHLORIDE 1 MG: 1 INJECTION, SOLUTION INTRAMUSCULAR; INTRAVENOUS; SUBCUTANEOUS at 08:49

## 2017-10-06 RX ADMIN — SODIUM CHLORIDE 75 ML/HR: 450 INJECTION, SOLUTION INTRAVENOUS at 12:11

## 2017-10-06 RX ADMIN — Medication 40 MCG: at 08:20

## 2017-10-06 RX ADMIN — MIDAZOLAM HYDROCHLORIDE 2 MG: 1 INJECTION, SOLUTION INTRAMUSCULAR; INTRAVENOUS at 07:56

## 2017-10-06 RX ADMIN — SODIUM CHLORIDE 4 MG/HR: 900 INJECTION, SOLUTION INTRAVENOUS at 23:18

## 2017-10-06 RX ADMIN — PROPOFOL 80 MG: 10 INJECTION, EMULSION INTRAVENOUS at 08:50

## 2017-10-06 RX ADMIN — GLYCOPYRROLATE 0.4 MG: 0.2 INJECTION INTRAMUSCULAR; INTRAVENOUS at 10:16

## 2017-10-06 RX ADMIN — ROCURONIUM BROMIDE 50 MG: 10 INJECTION, SOLUTION INTRAVENOUS at 08:04

## 2017-10-06 RX ADMIN — HYDROCORTISONE SODIUM SUCCINATE 100 MG: 100 INJECTION, POWDER, FOR SOLUTION INTRAMUSCULAR; INTRAVENOUS at 08:11

## 2017-10-06 RX ADMIN — Medication 40 MCG: at 08:59

## 2017-10-06 RX ADMIN — DESMOPRESSIN ACETATE 2 MCG: 4 SOLUTION INTRAVENOUS at 18:06

## 2017-10-06 RX ADMIN — NEOSTIGMINE METHYLSULFATE 3 MG: 1 INJECTION INTRAVENOUS at 10:16

## 2017-10-06 RX ADMIN — HYDRALAZINE HYDROCHLORIDE 10 MG: 20 INJECTION INTRAMUSCULAR; INTRAVENOUS at 11:05

## 2017-10-06 RX ADMIN — MORPHINE SULFATE 2 MG: 2 INJECTION, SOLUTION INTRAMUSCULAR; INTRAVENOUS at 19:53

## 2017-10-06 RX ADMIN — CEFAZOLIN SODIUM 2 G: 1 INJECTION, POWDER, FOR SOLUTION INTRAMUSCULAR; INTRAVENOUS at 08:23

## 2017-10-06 RX ADMIN — Medication 120 MCG: at 08:32

## 2017-10-06 RX ADMIN — CEFAZOLIN 2 G: 1 INJECTION, POWDER, FOR SOLUTION INTRAMUSCULAR; INTRAVENOUS; PARENTERAL at 23:59

## 2017-10-06 RX ADMIN — PROPOFOL 120 MG: 10 INJECTION, EMULSION INTRAVENOUS at 08:04

## 2017-10-06 RX ADMIN — ROCURONIUM BROMIDE 10 MG: 10 INJECTION, SOLUTION INTRAVENOUS at 09:53

## 2017-10-06 RX ADMIN — Medication 10 ML: at 21:28

## 2017-10-06 RX ADMIN — SODIUM CHLORIDE, SODIUM LACTATE, POTASSIUM CHLORIDE, CALCIUM CHLORIDE: 600; 310; 30; 20 INJECTION, SOLUTION INTRAVENOUS at 09:44

## 2017-10-06 RX ADMIN — SODIUM CHLORIDE 2 MG/HR: 900 INJECTION, SOLUTION INTRAVENOUS at 21:27

## 2017-10-06 RX ADMIN — CEFAZOLIN 2 G: 1 INJECTION, POWDER, FOR SOLUTION INTRAMUSCULAR; INTRAVENOUS; PARENTERAL at 16:28

## 2017-10-06 RX ADMIN — SODIUM CHLORIDE, SODIUM LACTATE, POTASSIUM CHLORIDE, CALCIUM CHLORIDE: 600; 310; 30; 20 INJECTION, SOLUTION INTRAVENOUS at 07:56

## 2017-10-06 RX ADMIN — Medication 40 MCG: at 10:07

## 2017-10-06 RX ADMIN — FENTANYL CITRATE 100 MCG: 50 INJECTION, SOLUTION INTRAMUSCULAR; INTRAVENOUS at 08:04

## 2017-10-06 RX ADMIN — LABETALOL HYDROCHLORIDE 10 MG: 5 INJECTION INTRAVENOUS at 18:19

## 2017-10-06 RX ADMIN — Medication 120 MCG: at 08:28

## 2017-10-06 RX ADMIN — SODIUM CHLORIDE 100 ML/HR: 900 INJECTION, SOLUTION INTRAVENOUS at 11:34

## 2017-10-06 RX ADMIN — ONDANSETRON 4 MG: 2 INJECTION INTRAMUSCULAR; INTRAVENOUS at 19:54

## 2017-10-06 RX ADMIN — POTASSIUM CHLORIDE 10 MEQ: 14.9 INJECTION, SOLUTION INTRAVENOUS at 19:04

## 2017-10-06 NOTE — PERIOP NOTES
10:01 AM  FLOSEAL 5 mL WAS GIVEN TO THE STERILE FIELD TO BE USED BY MD  REF: [id number]  LOT: [id number]  EXP: 01/31/2018

## 2017-10-06 NOTE — PERIOP NOTES
TRANSFER - OUT REPORT:    Verbal report given to [address] TITUS RN(name) on Giovanna Self  being transferred to ICU(unit) for routine post - op       Report consisted of patients Situation, Background, Assessment and   Recommendations(SBAR). Time Pre op antibiotic given:Y  Anesthesia Stop time: Y  Mahmood Present on Transfer to floor:Y  Order for Mahmood on Chart:Y    Information from the following report(s) SBAR was reviewed with the receiving nurse. Opportunity for questions and clarification was provided. Is the patient on 02? NO       L/Min        Other     Is the patient on a monitor? YES    Is the nurse transporting with the patient? YES    Surgical Waiting Area notified of patient's transfer from PACU? YES      The following personal items collected during your admission accompanied patient upon transfer:   Dental Appliance: Dental Appliances: None  Vision: Visual Aid: Glasses, With patient  Hearing Aid:    Jewelry: Jewelry: None  Clothing: Clothing: Pants, Shirt, Pajamas, Bathrobe  Other Valuables:  Other Valuables: None  Valuables sent to safe:

## 2017-10-06 NOTE — ANESTHESIA PROCEDURE NOTES
Arterial Line Placement    Performed by: Letitia Head by: Silvia Garber     Pre-Procedure  Indications:  Arterial pressure monitoring and blood sampling  Preanesthetic Checklist: patient identified, risks and benefits discussed, anesthesia consent, site marked, patient being monitored, timeout performed and patient being monitored      Procedure:   Prep:  Chlorhexidine  Seldinger Technique?: Yes    Orientation:  Right  Location:  Radial artery  Number of attempts:  2  Cont Cardiac Output Sensor: No      Assessment:   Post-procedure:  Line secured and sterile dressing applied  Patient Tolerance:  Patient tolerated the procedure well with no immediate complications

## 2017-10-06 NOTE — ROUTINE PROCESS
Patient: Benedicto Howe MRN: [id number]  SSN: [id number]   YOB: 1945  Age: 67 y.o. Sex: female     Patient is status post Procedure(s):  CT BRAIN LAB GUIDED TRANSPHENOIDAL REMOVAL PITUITARY TUMOR   ABDOMINAL FAT GRAFTING. Surgeon(s) and Role:     * Yina Tyler MD - Assisting     * Francine Hennessy MD - Primary    Local/Dose/Irrigation:  2 ML 1% LIDOCAINE WITH EPINEPHRINE WAS INJECTED INTO PATIENT'S NOSE    bacitracin 50,000 Units in sodium chloride irrigation 0.9 % 1,000 mL Irrigation USED PRN BY MD                Peripheral IV 10/05/17 Right Antecubital (Active)   Site Assessment Clean, dry, & intact 10/6/2017 12:00 AM   Phlebitis Assessment 0 10/6/2017 12:00 AM   Infiltration Assessment 0 10/6/2017 12:00 AM   Dressing Status Clean, dry, & intact 10/6/2017 12:00 AM   Dressing Type Transparent 10/6/2017 12:00 AM   Hub Color/Line Status Capped;Pink;Flushed 10/6/2017 12:00 AM   Action Taken Open ports on tubing capped 10/6/2017 12:00 AM   Alcohol Cap Used Yes 10/6/2017 12:00 AM       Peripheral IV 10/06/17 Left Wrist (Active)      Arterial Line 10/06/17 Right Radial artery (Active)          Airway - Endotracheal Tube 10/06/17 Oral (Active)                   Dressing/Packing:  Wound Nose Right;Left-DRESSING TYPE: Other (Comment); Zinc based paste (BRAGA SPLINTS AND DRIP PAD) (10/06/17 1014)  Wound Abdomen-DRESSING TYPE: Topical skin adhesive/glue (10/06/17 1014)  Splint/Cast:  ]    Other:  SCHNEIDER IN PLACE        EXTRA BACITRACIN OINTMENT SENT WITH PATIENT TO RECOVERY

## 2017-10-06 NOTE — PERIOP NOTES
8:41 AM  UNABLE TO SPEAK TO PATIENT'S  TO UPDATE HIM ON PATIENT'S SURGICAL STATUS.  THIS NURSE LEFT A MESSAGE WITH THE SURGICAL WAITING AREA VOLUNTEER.    9:04 AM  SPOKE TO PATIENT'S  (CANDY SELF) TO UPDATE HIM ON PATIENT'S SURGICAL STATUS

## 2017-10-06 NOTE — OP NOTES
[address]   [address], [address]   OP NOTE       Name:  Amanda Tapia   MR#:  [id number]   :  1945   Account #:  [de-identified]    Surgery Date:  10/06/2017   Date of Adm:  10/04/2017       PREOPERATIVE DIAGNOSIS: Pituitary tumor. POSTOPERATIVE DIAGNOSIS: Pituitary tumor. PROCEDURES PERFORMED: Transsphenoidal resection of pituitary   tumor with abdominal fat graft. CO-SURGEONS:   Jamie Gage MD   2. Skyla Storey. Juliane Sanon MD     ANESTHESIA: General endotracheal anesthesia. I will be dictating the approach and the closing. Dr. Antonino Villegas will be   dictating the tumor removal and fat graft under a separate dictation. INDICATIONS: The patient is a 79-year-old female who was found to   have a large pituitary mass with compressive symptoms of visual   changes and she is admitted to the hospital and brought urgently to the   operating room for removal of this tumor. DESCRIPTION OF PROCEDURE: The patient was brought to the   operating room and placed upon the operating table in the supine   position. After the induction of general endotracheal anesthesia, the   image guidance apparatus was applied and calibrated and found to be   working properly. The patient was then prepped and draped in the   usual fashion for the face and toward the abdominal fat graft in the left   lower quadrant. The nose was examined and the nasal septum on the   left side was injected with 1% lidocaine with 1:200,000 epinephrine   solution. A total of 2 mL of injection was used. A hemitransfixion incision was made at the proximal septum. This was   carried to the level of the septal cartilage. A mucoperichondrial flap   was then elevated along the septum. This was carried across the   cartilaginous septum to the bony septum. The flap was extended   inferiorly to the floor of the nose on the right side and then the left side.    Once the bony septum had been exposed and the mucoperiosteum were elevated off the bony septum on the left side, the junction of the   cartilaginous and bony septums were identified and disarticulated. Flaps were raised bilaterally on the bony septum. The cartilaginous   septum was then disarticulated from the maxillary crest and retracted   to the right. The sphenoid rostrum was identified. The bony septum   was then removed with the Sara punch and cup forceps,   saving bits of bone for reconstruction of the sella for later. Once the   sphenoid ostium was identified, the [address] [facility] [address] retractor was then placed   in through the left nostril and the operating microscope was brought   into the field and this was used to visualize the face of the sphenoid. The right and left sphenoid ostia were identified and a sphenoidotomy   was performed with a Penfield elevator. About 1 mm downbiting   Kerrisons were then used to create sphenoidotomies on both sides. As   the opening was made larger, the 2 mm Kerrisons were then used and   face of the sphenoid was taken down completely and the sphenoid   mucosa was stripped and the folds of the tumor could be seen   obviously filling the majority of the sphenoid sinus. Once exposure was   deemed wide enough, I stepped aside and Dr. Luis Benson scrubbed in and   performed the tumor removal. This is dictated separately. Once Dr. Luis Benson completed the tumor removal and reconstruction of the floor of   the sella, I then scrubbed back in. Examination of the nose   demonstrated hemostasis to be adequate. The [address] [facility] [address] retractor was   removed. The septum was then placed back in its normal position. The   hemitransfixion incision was closed with interrupted 3-0 chromic   sutures. Bacitracin-coated Merocel packs were placed bilaterally and   the procedure was terminated. ESTIMATED BLOOD LOSS: For opening and closing is less than 20   mL. SPECIMENS REMOVED: No specimens from the opening and   closing.  Dr. Luis Benson will list his specimens on his dictation.         MD PERLITA Huddleston / BRIA   D:  10/06/2017   10:29   T:  10/06/2017   11:17   Job #:  [id number]

## 2017-10-06 NOTE — INTERDISCIPLINARY ROUNDS
IDR/SLIDR Summary          Patient: Moraima Avila MRN: [id number]    Age: 67 y.o. YOB: 1945 Room/Bed: [facility]   Admit Diagnosis: Pituitary Tumor  [facility]  Principal Diagnosis: <principal problem not specified>   Goals: normal Na, surgery  Readmission: NO  Quality Measure: Not applicable  VTE Prophylaxis: Mechanical  Influenza Vaccine screening completed? YES  Pneumococcal Vaccine screening completed? NO  Mobility needs: No   Nutrition plan:No  Consults:P.T, O.T. and Case Management    Financial concerns:No  Escalated to CM? YES  RRAT Score: 16   Interventions:Home Health  Testing due for pt today?  YES  LOS: 2 days Expected length of stay 3-5 days  Discharge plan: TBD   PCP: Kade Robertson MD  Transportation needs: No    Days before discharge:two or more days before discharge   Discharge disposition: tbd    Signed:     Vijay Vega RN  10/6/2017

## 2017-10-06 NOTE — ROUTINE PROCESS
Bedside shift change report given to Ladan (oncoming nurse) by Aria Garrison (offgoing nurse). Report included the following information SBAR, Kardex, Intake/Output, MAR, Recent Results and Cardiac Rhythm NSR.

## 2017-10-06 NOTE — ANESTHESIA PREPROCEDURE EVALUATION
Anesthetic History   No history of anesthetic complications            Review of Systems / Medical History  Patient summary reviewed, nursing notes reviewed and pertinent labs reviewed    Pulmonary  Within defined limits                 Neuro/Psych   Within defined limits           Cardiovascular    Hypertension              Exercise tolerance: >4 METS     GI/Hepatic/Renal  Within defined limits              Endo/Other      Hypothyroidism  Arthritis    Comments: hyperparathyroidism Other Findings   Comments: Hyponatremia  Pituitary adenoma           Physical Exam    Airway  Mallampati: III  TM Distance: 4 - 6 cm  Neck ROM: normal range of motion   Mouth opening: Normal     Cardiovascular  Regular rate and rhythm,  S1 and S2 normal,  no murmur, click, rub, or gallop             Dental  No notable dental hx       Pulmonary  Breath sounds clear to auscultation               Abdominal  GI exam deferred       Other Findings            Anesthetic Plan    ASA: 3  Anesthesia type: general    Monitoring Plan: Arterial line      Induction: Intravenous  Anesthetic plan and risks discussed with: Patient

## 2017-10-06 NOTE — BRIEF OP NOTE
BRIEF OPERATIVE NOTE    Date of Procedure: 10/6/2017   Preoperative Diagnosis: PITUITARY TUMOR   Postoperative Diagnosis: PITUITARY TUMOR     Procedure(s):  CT BRAIN LAB GUIDED TRANSPHENOIDAL REMOVAL PITUITARY TUMOR   ABDOMINAL FAT GRAFTING  Surgeon(s) and Role:     * Negro Edmonds MD - Assisting     * Elmarie Najjar, MD - Primary         Assistant Staff:       Surgical Staff:  Circ-1: Leyla Borja RN  Circ-Relief: Brodie Salgado RN  Scrub RN-1: Elle Lees RN  Surg Asst-1: Luisito Reese  Float Staff: Brodie Salgado RN  Event Time In   Incision Start 0595   Incision Close 1015     Anesthesia: General   Estimated Blood Loss: none  Specimens:   ID Type Source Tests Collected by Time Destination   1 : Pituitary Adenoma Tissue Fresh Tissue  Elmarie Najjar, MD 10/6/2017 7258 Pathology   1 : PITUITARY ASPIRATES Fresh Aspirate  Elmarie Najjar, MD 10/6/2017 1005 Cytology      Findings: mostly old hemorrhage as expected with rim of tumor tissue at edges  It appeared that there was normal pituitary posteriorly  No overt CSF leak but packed empty sella with fat graft  Complications: none  Implants: * No implants in log *

## 2017-10-06 NOTE — PROGRESS NOTES
Repeat sodium up to 134;to note we started with a sodium of 121 than dipped to 117 than up to 129;worry about post op DI,with sodium up to 134(worry about overcorrection);will give one dose of ddavp and recheck labs q4 hr  To slow down any increase in sodium level

## 2017-10-06 NOTE — PROGRESS NOTES
RENAL  PROGRESS NOTE        Subjective:    COMPLAINT: seen in PACU,post op;had 1.5 liters of LR in OR and 850 cc of UO ;still waking up  Objective:   VITALS SIGNS:    Visit Vitals    /52    Pulse 71    Temp 97.8 °F (36.6 °C)    Resp 22    Wt 55 kg (121 lb 3.2 oz)    SpO2 100%    BMI 22.17 kg/m2       O2 Device: 02 face tent       Temp (24hrs), Av.4 °F (36.3 °C), Min:96.2 °F (35.7 °C), Max:97.9 °F (36.6 °C)         PHYSICAL EXAM:  And soft  Trace edema    DATA REVIEW:     INTAKE / OUTPUT:   Last shift:      10/06 0701 - 10/06 1900  In: 9725 [I.V.:1550]  Out: 560 [Urine:550]  Last 3 shifts: 10/04 1901 - 10/06 0700  In: 1280 [P.O.:1280]  Out: 1000 [Urine:1000]    Intake/Output Summary (Last 24 hours) at 10/06/17 1212  Last data filed at 10/06/17 1145   Gross per 24 hour   Intake             1770 ml   Output             1460 ml   Net              310 ml         LABS:   Recent Labs      10/04/17   1905   WBC  5.8   HGB  13.2   HCT  36.5   PLT  182     Recent Labs      10/06/17   0617  10/05/17   2315  10/05/17   1921  10/05/17   1308   10/04/17   1905   NA  128*  129*  123*  117*   < >  122*   K   --   3.6  3.8  3.5   --   3.3*   CL   --   92*  89*  84*   --   88*   CO2   --   21  20*  21   --   22   GLU   --   117*  122*  143*   --   149*   BUN   --   16  15  16   --   13   CREA   --   1.10*  1.03*  0.86   --   0.81   CA   --   8.6  8.6  8.5   --   8.6   MG   --    --    --   1.7   --    --    INR   --    --    --    --    --   1.0    < > = values in this interval not displayed. 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 6.17    extubated    Plan:   Hypotonic fluid for now  Check Uosm  BMP q4hr  Will need to use ddavp in case repeat sodium  >135  Monitor UO  Discussed in length with PACU RN and dr Veronica Emery MD

## 2017-10-06 NOTE — OP NOTES
[address] [facility] [address] [place] [address], [address]   OP NOTE       Name:  Amanda Tapia   MR#:  [id number]   :  1945   Account #:  [de-identified]    Surgery Date:  10/06/2017   Date of Adm:  10/04/2017       PREOPERATIVE DIAGNOSIS: Cystic pituitary adenoma. POSTOPERATIVE DIAGNOSIS: Cystic pituitary adenoma. PROCEDURES PERFORMED:  Transsphenoidal removal of pituitary   adenoma and application of abdominal fat graft, use of   neuronavigation. ESTIMATED BLOOD LOSS: None. SPECIMENS REMOVED: Tumor and cystic fluid. ANESTHESIA:  General.    SURGEON: Josue Zee MD    CO-SURGEONLaurie Ruiz. Juliane Sanon MD, ENT    COMPLICATIONS: None. DESCRIPTION OF PROCEDURE: Review of imaging studies revealed   a large cystic suprasellar mass consistent with an infarcted pituitary   adenoma. She also had a history of hyponatremia likely related to this. She was admitted to the hospital, started on steroids. Her condition   improved significantly. She was taken to the operating room after   discussing risks, benefits, and alternatives of surgery with her and she   wished to proceed. Appropriate antibiotics were administered   preoperatively. Prior to being taken to the operating room, she   underwent a CT contrast study of the brain and pituitary for use with   the neuronavigation system. The navigation array was applied to the   patient's scalp, registered and a time-out performed to identify the   correct the patient and procedure. Sequential stockings applied for   DVT prophylaxis. The patient's head was rested on a horseshoe head   rest. Dr. Juliane Sanon performed the transsphenoidal endonasal approach at   which point I came into the operative field, visualized the dura covering   the sella with the [facility] retractor in the patient's left nostril. The   retractor was somewhat widened. The midline bone septum was   removed in order to improve the exposure.  The dura was cauterized   with bipolar electrocautery. A 25-gauge spinal needle was inserted into   the dura because it was felt that the lesion was largely cystic and   indeed xanthochromic fluid consistent with prior hemorrhage was   aspirated. It was sent as a separate specimen for cytology. The dura   was then opened in a cruciate fashion and further xanthochromic fluid   was suctioned. Diaphragmatic sella was visualized superiorly and using   the navigation system a gross total removal of the lesion appeared to   be achieved. Using a ring curette circumferentially since the sella was   largely emptied after aspiration of fluid, there was indeed tumor along   the edges which was removed with the curettes until no further tumor   was visually identified. Posteriorly, corresponding to the imaging   studies, there appeared to be some normal pituitary so that was left in   place. A Valsalva maneuver was performed. No CSF leak was   identified, but there were some weeping-like fluid and although there   was no overt CSF leak, it was felt better to pack the completely   otherwise empty sella with a small fat graft taken from the patient's   abdomen via a separate incision which had been scrubbed, prepped   and draped prior to final positioning. That incision was closed in layers   in the abdomen. Fat was placed in the sella along with some Gelfoam   and FloSeal. A piece of bone was trimmed to a size that had been   taken from the septum during the approach and used to cover the   defect in the dura. Another piece of Gelfoam was then placed over that. Dr. Glen Hamlin then returned to complete the closure.         MD Debra Connors / MIRA   D:  10/06/2017   10:55   T:  10/06/2017   11:32   Job #:  [id number]

## 2017-10-06 NOTE — ANESTHESIA POSTPROCEDURE EVALUATION
Post-Anesthesia Evaluation and Assessment    Patient: Speedy White MRN: [id number]  SSN: [id number]    YOB: 1945  Age: 67 y.o. Sex: female       Cardiovascular Function/Vital Signs  Visit Vitals    /58 (BP 1 Location: Right arm, BP Patient Position: At rest;Supine; Head of bed elevated (Comment degrees))    Pulse 91    Temp 36.6 °C (97.8 °F)    Resp 14    Wt 55 kg (121 lb 3.2 oz)    SpO2 94%    BMI 22.17 kg/m2       Patient is status post general anesthesia for Procedure(s):  CT BRAIN LAB GUIDED TRANSPHENOIDAL REMOVAL PITUITARY TUMOR   ABDOMINAL FAT GRAFTING. Nausea/Vomiting: None    Postoperative hydration reviewed and adequate. Pain:  Pain Scale 1: Numeric (0 - 10) (10/06/17 1330)  Pain Intensity 1: 0 (10/06/17 1330)   Managed    Neurological Status:   Neuro (WDL): Within Defined Limits (10/06/17 1330)  Neuro  Neurologic State: Alert; Appropriate for age;Eyes open spontaneously (10/06/17 1330)  Orientation Level: Oriented X4 (10/06/17 1330)  Cognition: Appropriate for age attention/concentration; Follows commands (10/06/17 1330)  Speech: Appropriate for age;Clear (10/06/17 1330)  Assessment L Pupil: Brisk;Round (10/06/17 1330)  Size L Pupil (mm): 3 (10/06/17 1330)  Assessment R Pupil: Brisk;Round (10/06/17 1330)  Size R Pupil (mm): 3 (10/06/17 1330)  LUE Motor Response: Purposeful (10/06/17 1330)  LLE Motor Response: Purposeful (10/06/17 1330)  RUE Motor Response: Pharmocologically paralyzed (10/06/17 1330)  RLE Motor Response: Purposeful (10/06/17 1330)   At baseline    Mental Status and Level of Consciousness: Arousable    Pulmonary Status:   O2 Device: Room air (10/06/17 1330)   Adequate oxygenation and airway patent    Complications related to anesthesia: None    Post-anesthesia assessment completed.  No concerns    Signed By: Tri Leslie MD     October 6, 2017

## 2017-10-07 ENCOUNTER — APPOINTMENT (OUTPATIENT)
Dept: CT IMAGING | Age: 72
DRG: 614 | End: 2017-10-07
Attending: NEUROLOGICAL SURGERY
Payer: MEDICARE

## 2017-10-07 LAB
ALBUMIN SERPL-MCNC: 3.8 G/DL (ref 3.5–5)
ALBUMIN/GLOB SERPL: 1.2 {RATIO} (ref 1.1–2.2)
ALP SERPL-CCNC: 64 U/L (ref 45–117)
ALT SERPL-CCNC: 22 U/L (ref 12–78)
ANION GAP SERPL CALC-SCNC: 10 MMOL/L (ref 5–15)
ANION GAP SERPL CALC-SCNC: 11 MMOL/L (ref 5–15)
ANION GAP SERPL CALC-SCNC: 11 MMOL/L (ref 5–15)
ANION GAP SERPL CALC-SCNC: 9 MMOL/L (ref 5–15)
AST SERPL-CCNC: 19 U/L (ref 15–37)
BASOPHILS # BLD: 0 K/UL (ref 0–0.1)
BASOPHILS NFR BLD: 0 % (ref 0–1)
BILIRUB SERPL-MCNC: 0.2 MG/DL (ref 0.2–1)
BUN SERPL-MCNC: 12 MG/DL (ref 6–20)
BUN SERPL-MCNC: 15 MG/DL (ref 6–20)
BUN SERPL-MCNC: 18 MG/DL (ref 6–20)
BUN SERPL-MCNC: 19 MG/DL (ref 6–20)
BUN/CREAT SERPL: 12 (ref 12–20)
BUN/CREAT SERPL: 17 (ref 12–20)
BUN/CREAT SERPL: 18 (ref 12–20)
BUN/CREAT SERPL: 21 (ref 12–20)
CALCIUM SERPL-MCNC: 7.7 MG/DL (ref 8.5–10.1)
CALCIUM SERPL-MCNC: 8 MG/DL (ref 8.5–10.1)
CALCIUM SERPL-MCNC: 8.1 MG/DL (ref 8.5–10.1)
CALCIUM SERPL-MCNC: 8.2 MG/DL (ref 8.5–10.1)
CHLORIDE SERPL-SCNC: 101 MMOL/L (ref 97–108)
CHLORIDE SERPL-SCNC: 102 MMOL/L (ref 97–108)
CHLORIDE SERPL-SCNC: 94 MMOL/L (ref 97–108)
CHLORIDE SERPL-SCNC: 97 MMOL/L (ref 97–108)
CO2 SERPL-SCNC: 22 MMOL/L (ref 21–32)
CO2 SERPL-SCNC: 22 MMOL/L (ref 21–32)
CO2 SERPL-SCNC: 23 MMOL/L (ref 21–32)
CO2 SERPL-SCNC: 24 MMOL/L (ref 21–32)
CREAT SERPL-MCNC: 0.83 MG/DL (ref 0.55–1.02)
CREAT SERPL-MCNC: 0.91 MG/DL (ref 0.55–1.02)
CREAT SERPL-MCNC: 1.03 MG/DL (ref 0.55–1.02)
CREAT SERPL-MCNC: 1.06 MG/DL (ref 0.55–1.02)
DIFFERENTIAL METHOD BLD: ABNORMAL
EOSINOPHIL # BLD: 0 K/UL (ref 0–0.4)
EOSINOPHIL NFR BLD: 0 % (ref 0–7)
ERYTHROCYTE [DISTWIDTH] IN BLOOD BY AUTOMATED COUNT: 12.7 % (ref 11.5–14.5)
GLOBULIN SER CALC-MCNC: 3.1 G/DL (ref 2–4)
GLUCOSE BLD STRIP.AUTO-MCNC: 117 MG/DL (ref 65–100)
GLUCOSE BLD STRIP.AUTO-MCNC: 133 MG/DL (ref 65–100)
GLUCOSE BLD STRIP.AUTO-MCNC: 146 MG/DL (ref 65–100)
GLUCOSE SERPL-MCNC: 128 MG/DL (ref 65–100)
GLUCOSE SERPL-MCNC: 135 MG/DL (ref 65–100)
GLUCOSE SERPL-MCNC: 144 MG/DL (ref 65–100)
GLUCOSE SERPL-MCNC: 163 MG/DL (ref 65–100)
HCT VFR BLD AUTO: 32.9 % (ref 35–47)
HGB BLD-MCNC: 11.4 G/DL (ref 11.5–16)
LYMPHOCYTES # BLD: 0.4 K/UL (ref 0.8–3.5)
LYMPHOCYTES NFR BLD: 4 % (ref 12–49)
MAGNESIUM SERPL-MCNC: 2.1 MG/DL (ref 1.6–2.4)
MCH RBC QN AUTO: 30.2 PG (ref 26–34)
MCHC RBC AUTO-ENTMCNC: 34.7 G/DL (ref 30–36.5)
MCV RBC AUTO: 87.3 FL (ref 80–99)
MONOCYTES # BLD: 0.5 K/UL (ref 0–1)
MONOCYTES NFR BLD: 5 % (ref 5–13)
NEUTS SEG # BLD: 9.6 K/UL (ref 1.8–8)
NEUTS SEG NFR BLD: 91 % (ref 32–75)
OSMOLALITY SERPL: 272 MOSM/KG H2O
OSMOLALITY UR: 206 MOSM/KG H2O
PHOSPHATE SERPL-MCNC: 2.6 MG/DL (ref 2.6–4.7)
PLATELET # BLD AUTO: 213 K/UL (ref 150–400)
POTASSIUM SERPL-SCNC: 3.3 MMOL/L (ref 3.5–5.1)
POTASSIUM SERPL-SCNC: 3.4 MMOL/L (ref 3.5–5.1)
POTASSIUM SERPL-SCNC: 3.4 MMOL/L (ref 3.5–5.1)
POTASSIUM SERPL-SCNC: 3.8 MMOL/L (ref 3.5–5.1)
POTASSIUM UR-SCNC: 16 MMOL/L
PROT SERPL-MCNC: 6.9 G/DL (ref 6.4–8.2)
RBC # BLD AUTO: 3.77 M/UL (ref 3.8–5.2)
RBC MORPH BLD: ABNORMAL
SERVICE CMNT-IMP: ABNORMAL
SODIUM SERPL-SCNC: 127 MMOL/L (ref 136–145)
SODIUM SERPL-SCNC: 130 MMOL/L (ref 136–145)
SODIUM SERPL-SCNC: 134 MMOL/L (ref 136–145)
SODIUM SERPL-SCNC: 135 MMOL/L (ref 136–145)
SODIUM UR-SCNC: 48 MMOL/L
SP GR UR REFRACTOMETRY: 1.02 (ref 1–1.03)
WBC # BLD AUTO: 10.5 K/UL (ref 3.6–11)

## 2017-10-07 PROCEDURE — 80048 BASIC METABOLIC PNL TOTAL CA: CPT | Performed by: INTERNAL MEDICINE

## 2017-10-07 PROCEDURE — 74011636637 HC RX REV CODE- 636/637: Performed by: INTERNAL MEDICINE

## 2017-10-07 PROCEDURE — 83930 ASSAY OF BLOOD OSMOLALITY: CPT | Performed by: INTERNAL MEDICINE

## 2017-10-07 PROCEDURE — 84133 ASSAY OF URINE POTASSIUM: CPT | Performed by: INTERNAL MEDICINE

## 2017-10-07 PROCEDURE — 81002 URINALYSIS NONAUTO W/O SCOPE: CPT | Performed by: NEUROLOGICAL SURGERY

## 2017-10-07 PROCEDURE — 74011250637 HC RX REV CODE- 250/637: Performed by: NURSE PRACTITIONER

## 2017-10-07 PROCEDURE — 84100 ASSAY OF PHOSPHORUS: CPT | Performed by: INTERNAL MEDICINE

## 2017-10-07 PROCEDURE — 74011000258 HC RX REV CODE- 258: Performed by: INTERNAL MEDICINE

## 2017-10-07 PROCEDURE — 70450 CT HEAD/BRAIN W/O DYE: CPT

## 2017-10-07 PROCEDURE — 65610000006 HC RM INTENSIVE CARE

## 2017-10-07 PROCEDURE — 80053 COMPREHEN METABOLIC PANEL: CPT | Performed by: INTERNAL MEDICINE

## 2017-10-07 PROCEDURE — 82962 GLUCOSE BLOOD TEST: CPT

## 2017-10-07 PROCEDURE — 80048 BASIC METABOLIC PNL TOTAL CA: CPT | Performed by: NEUROLOGICAL SURGERY

## 2017-10-07 PROCEDURE — 74011250636 HC RX REV CODE- 250/636: Performed by: NEUROLOGICAL SURGERY

## 2017-10-07 PROCEDURE — 74011250637 HC RX REV CODE- 250/637: Performed by: NEUROLOGICAL SURGERY

## 2017-10-07 PROCEDURE — 83935 ASSAY OF URINE OSMOLALITY: CPT | Performed by: INTERNAL MEDICINE

## 2017-10-07 PROCEDURE — 85025 COMPLETE CBC W/AUTO DIFF WBC: CPT | Performed by: NEUROLOGICAL SURGERY

## 2017-10-07 PROCEDURE — 74011000250 HC RX REV CODE- 250: Performed by: NEUROLOGICAL SURGERY

## 2017-10-07 PROCEDURE — 83735 ASSAY OF MAGNESIUM: CPT | Performed by: INTERNAL MEDICINE

## 2017-10-07 PROCEDURE — 84300 ASSAY OF URINE SODIUM: CPT | Performed by: INTERNAL MEDICINE

## 2017-10-07 PROCEDURE — 74011000250 HC RX REV CODE- 250: Performed by: INTERNAL MEDICINE

## 2017-10-07 PROCEDURE — 82436 ASSAY OF URINE CHLORIDE: CPT | Performed by: INTERNAL MEDICINE

## 2017-10-07 PROCEDURE — 74011250636 HC RX REV CODE- 250/636: Performed by: INTERNAL MEDICINE

## 2017-10-07 PROCEDURE — 36415 COLL VENOUS BLD VENIPUNCTURE: CPT | Performed by: NEUROLOGICAL SURGERY

## 2017-10-07 RX ORDER — INSULIN LISPRO 100 [IU]/ML
INJECTION, SOLUTION INTRAVENOUS; SUBCUTANEOUS EVERY 6 HOURS
Status: DISCONTINUED | OUTPATIENT
Start: 2017-10-07 | End: 2017-10-08

## 2017-10-07 RX ORDER — LEVOTHYROXINE SODIUM 75 UG/1
75 TABLET ORAL
Status: DISCONTINUED | OUTPATIENT
Start: 2017-10-08 | End: 2017-10-10 | Stop reason: HOSPADM

## 2017-10-07 RX ORDER — BACITRACIN 500 [USP'U]/G
OINTMENT TOPICAL 2 TIMES DAILY
Status: DISCONTINUED | OUTPATIENT
Start: 2017-10-07 | End: 2017-10-10 | Stop reason: HOSPADM

## 2017-10-07 RX ORDER — HYDROCORTISONE SODIUM SUCCINATE 100 MG/2ML
50 INJECTION, POWDER, FOR SOLUTION INTRAMUSCULAR; INTRAVENOUS EVERY 8 HOURS
Status: DISCONTINUED | OUTPATIENT
Start: 2017-10-07 | End: 2017-10-09

## 2017-10-07 RX ORDER — MAGNESIUM SULFATE 100 %
4 CRYSTALS MISCELLANEOUS AS NEEDED
Status: DISCONTINUED | OUTPATIENT
Start: 2017-10-07 | End: 2017-10-10 | Stop reason: HOSPADM

## 2017-10-07 RX ORDER — DEXTROSE 50 % IN WATER (D50W) INTRAVENOUS SYRINGE
12.5-25 AS NEEDED
Status: DISCONTINUED | OUTPATIENT
Start: 2017-10-07 | End: 2017-10-10 | Stop reason: HOSPADM

## 2017-10-07 RX ORDER — SODIUM CHLORIDE 9 MG/ML
50 INJECTION, SOLUTION INTRAVENOUS CONTINUOUS
Status: DISCONTINUED | OUTPATIENT
Start: 2017-10-07 | End: 2017-10-09

## 2017-10-07 RX ADMIN — SODIUM CHLORIDE 5 MG/HR: 900 INJECTION, SOLUTION INTRAVENOUS at 04:42

## 2017-10-07 RX ADMIN — INSULIN LISPRO 2 UNITS: 100 INJECTION, SOLUTION INTRAVENOUS; SUBCUTANEOUS at 17:48

## 2017-10-07 RX ADMIN — AMLODIPINE BESYLATE 5 MG: 5 TABLET ORAL at 09:06

## 2017-10-07 RX ADMIN — DOCUSATE SODIUM 100 MG: 100 CAPSULE, LIQUID FILLED ORAL at 17:43

## 2017-10-07 RX ADMIN — HYDROCORTISONE SODIUM SUCCINATE 50 MG: 100 INJECTION, POWDER, FOR SOLUTION INTRAMUSCULAR; INTRAVENOUS at 15:53

## 2017-10-07 RX ADMIN — DOCUSATE SODIUM 100 MG: 100 CAPSULE, LIQUID FILLED ORAL at 09:06

## 2017-10-07 RX ADMIN — BACITRACIN: 500 OINTMENT TOPICAL at 17:43

## 2017-10-07 RX ADMIN — BACITRACIN: 500 OINTMENT TOPICAL at 11:59

## 2017-10-07 RX ADMIN — HYDROCORTISONE SODIUM SUCCINATE 50 MG: 100 INJECTION, POWDER, FOR SOLUTION INTRAMUSCULAR; INTRAVENOUS at 09:12

## 2017-10-07 RX ADMIN — HYDROCODONE BITARTRATE AND ACETAMINOPHEN 1 TABLET: 5; 325 TABLET ORAL at 09:58

## 2017-10-07 RX ADMIN — ONDANSETRON 4 MG: 2 INJECTION INTRAMUSCULAR; INTRAVENOUS at 09:58

## 2017-10-07 RX ADMIN — Medication 10 ML: at 06:00

## 2017-10-07 RX ADMIN — SODIUM CHLORIDE 75 ML/HR: 450 INJECTION, SOLUTION INTRAVENOUS at 05:30

## 2017-10-07 RX ADMIN — SODIUM CHLORIDE: 900 INJECTION, SOLUTION INTRAVENOUS at 07:21

## 2017-10-07 RX ADMIN — SODIUM CHLORIDE 4 MG/HR: 900 INJECTION, SOLUTION INTRAVENOUS at 01:52

## 2017-10-07 RX ADMIN — MORPHINE SULFATE 2 MG: 2 INJECTION, SOLUTION INTRAMUSCULAR; INTRAVENOUS at 00:13

## 2017-10-07 RX ADMIN — SODIUM CHLORIDE 50 ML/HR: 900 INJECTION, SOLUTION INTRAVENOUS at 14:00

## 2017-10-07 RX ADMIN — Medication 10 ML: at 14:01

## 2017-10-07 RX ADMIN — FAMOTIDINE 20 MG: 20 TABLET ORAL at 09:06

## 2017-10-07 RX ADMIN — FAMOTIDINE 20 MG: 20 TABLET ORAL at 17:43

## 2017-10-07 NOTE — PROGRESS NOTES
ENT  Awake, alert. Minimal drainage from nose. Left nares  Crusted. Will leave packs in place another 24hrs. Bacitracin to nares BID.

## 2017-10-07 NOTE — PROGRESS NOTES
Right cn 6 with some binocular diplopia. Min h/a. Na improving. Recent ddavp. Cont cortef. Adv diet and activity. Cont icu today. Ct with some hemorrhage or residual peripheral tumor.

## 2017-10-07 NOTE — PROGRESS NOTES
Problem: Falls - Risk of  Goal: *Absence of Falls  Document Ersamo Fall Risk and appropriate interventions in the flowsheet.    Outcome: Progressing Towards Goal  Fall Risk Interventions:  Mobility Interventions: Communicate number of staff needed for ambulation/transfer           Medication Interventions: Evaluate medications/consider consulting pharmacy     Elimination Interventions: Patient to call for help with toileting needs, Toilet paper/wipes in reach, Toileting schedule/hourly rounds     History of Falls Interventions: Evaluate medications/consider consulting pharmacy

## 2017-10-07 NOTE — PROGRESS NOTES
RENAL  PROGRESS NOTE        Subjective:    COMPLAINT:   Awake, alert this am  Objective:   VITALS SIGNS:    Visit Vitals    /61    Pulse 81    Temp 97.6 °F (36.4 °C)    Resp 13    Wt 57 kg (125 lb 9.6 oz)    SpO2 96%    BMI 22.97 kg/m2       O2 Device: Room air       Temp (24hrs), Av.6 °F (36.4 °C), Min:96.2 °F (35.7 °C), Max:98.2 °F (36.8 °C)         PHYSICAL EXAM:  WDWN WF in NARD  No rales  RRR  Abd soft  No edema    DATA REVIEW:     INTAKE / OUTPUT:   Last shift:         Last 3 shifts: 10/05 1901 - 10/07 0700  In: 3404.2 [I.V.:3404.2]  Out: 2305 [Urine:2295]    Intake/Output Summary (Last 24 hours) at 10/07/17 0747  Last data filed at 10/07/17 0700   Gross per 24 hour   Intake          3404.17 ml   Output             1905 ml   Net          1499.17 ml         LABS:   Recent Labs      10/07/17   0406  10/04/17   1905   WBC  10.5  5.8   HGB  11.4*  13.2   HCT  32.9*  36.5   PLT  213  182     Recent Labs      10/07/17   0411  10/07/17   0100  10/06/17   2031   10/05/17   1308   10/04/17   1905   NA  134*  135*  135*   < >  117*   < >  122*   K  3.3*  3.4*  3.5   < >  3.5   --   3.3*   CL  101  102  102   < >  84*   --   88*   CO2  24  22  25   < >  21   --   22   GLU  135*  144*  131*   < >  143*   --   149*   BUN  19  18  18   < >  16   --   13   CREA  0.91  1.06*  1.04*   < >  0.86   --   0.81   CA  8.2*  7.7*  8.3*   < >  8.5   --   8.6   MG   --   2.1   --    --   1.7   --    --    PHOS   --   2.6   --    --    --    --    --    ALB   --   3.8   --    --    --    --    --    TBILI   --   0.2   --    --    --    --    --    SGOT   --   19   --    --    --    --    --    ALT   --   22   --    --    --    --    --    INR   --    --    --    --    --    --   1.0    < > = values in this interval not displayed. 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 6.17 extubated    Plan:   Hypotonic fluids increased   Will need to use ddavp if sodium  >135  Monitor UO  BMP q8  Jacques Lilly MD

## 2017-10-07 NOTE — PROGRESS NOTES
1930: Bedside shift change report given to Raul Redding, RN and SEN Muñoz (oncoming nurse) by Natalie Pallas, RN (offgoing nurse). Report included the following information SBAR, Kardex, ED Summary, OR Summary, Intake/Output, MAR, Recent Results, Med Rec Status, Cardiac Rhythm NSR and Alarm Parameters . 2155: Called renal, left message with number provided ([phone]). There was no option to page on call physician at this number. 2234: Dr. Krystin Alexander paged from this number [phone], received orders to increase 1/2NS to 75 ml/hr, and if midnight Na+ >135 give 2 mcg DDAVP IV.     0730: Bedside shift change report given to Candice Barnes RN (oncoming nurse) by Raul Redding RN and SEN Muñoz (offgoing nurse). Report included the following information SBAR, Kardex, ED Summary, OR Summary, Intake/Output, MAR, Recent Results, Med Rec Status and Cardiac Rhythm NSR. Shift Summary: VSS, patient alert and oriented x4, PERRL, moves all extremities, SBP b/w  on cardene drip, lung sounds clear and UOP adequate.

## 2017-10-07 NOTE — CONSULTS
[facility]    Medical Problems:  has a past medical history of Adverse effect of anesthesia; Arthritis; High cholesterol; Hypertension; Osteopenia; and Thyroid disease. 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 Neurosurgery & nephrology are managing. History Source: Patient  Consult Requested by: Neurosurgery  Chief complaint : General ICU care  Severity: Moderate sodium abnormalities  Began: Prior to surgery  Duration: Continuous  Course: Fluctuating  Worse with: Surgery  Better with: DDAVP & fluid adjustment      ROS: Potassium burning veins-  Other than noted above, a 12 point review of systems is negative for any other constitutional, opthalmologic, ENT, cardiovascular, pulmonary, gastrointestinal, urinary, neurologic, psychiatric, lymphatic, hematologic, oncologic,  integument or musculoskeletal issues.       IMPRESSION AND PLAN:    Sodium abnormality: SIADH-pituitary adenoma resection  Plan: Continue fluid and DDAVP management per nephrology    Pituitary mass: Resection 10/6/17-Dr. Harry Park  Plan: Routine postoperative care    Hyponatremia: Due to the above  Plan: Fluids, Mahmood catheter, sodium management per nephrology, DDAVP    Parathyroid adenoma: 12/2016  Plan: Monitor    Routine ICU Care  CODE STATUS:Full code  Ulcer prophylaxis:Per protocol  DVT prophylaxis:Per protocol  Mahmood: Maintain Mahmood catheter  Diet:Advance diet as tolerated  IV fluids: Adjust IV fluids per nephrology  Vasopressors:None  Delirium:Not present  Pain:Controlled with Current Regimen  Restraints:Not currently medically necessary  Glycemic control:Well-controlled  Disposition: Remain in ICU  Activity:Mobilize per ICU protocol  Lines:Remove per protocol    ·     PHYSICAL:    Intake/Output Summary (Last 24 hours) at 10/07/17 0809  Last data filed at 10/07/17 0700   Gross per 24 hour   Intake          3529.17 ml   Output             1905 ml   Net          1624.17 ml       Temp (24hrs), Av.6 °F (36.4 °C), Min:96.2 °F (35.7 °C), Max:98.2 °F (36.8 °C)       Visit Vitals    /61    Pulse 81    Temp 97.6 °F (36.4 °C)    Resp 13    Wt 57 kg (125 lb 9.6 oz)    SpO2 96%    BMI 22.97 kg/m2         Other:     Tubes:  [] NGT   [] ETT  [] Mahmood  [] C-Line      General:  [x] Nontoxic   [x] No Distress  [] Toxic  [] Distressed      Eyes:  [x] Anicteric    [x] Noninjected  [] Icteric  [] injected      ENT:  [x] Moist   [x] No Thrush  [] Dry  [] Thrush      CV:  [x] Regular    [x] No Murmur  [] Irregular  [] Murmur      LUNG:  [x] Clear    [x] Equal BS  [] Wheeze  [] Unequal BS      GI:  [x] NABS  [x] Nontender  [] No Sounds  [] Tender      :  [x] Clear Urine  [x] Norm Genitalia   [] Mahmood  [] Defered Exam      SKEL:  [x] WD WN  [x] No Clubbing  [] Wasted  [] Clubbing      SKIN:  [x] Perfused    [x] No Drug Rash  [] Cool  [] Rash      NEURO:  [x] A & O x 3  [x]  Non Focal  [] Confused  [] Sedated       PSYCH:  [x] Nonagitated  [x] Good Insight  [] Agitated  [] Poor Insight      LYMPH:  [x] No edema   [x] No CHRISTINE  [] Edema  [] [facility] CHRISTINE        VENTILATOR - BIPAP - O2:   [x]  Reviewed Ventilator / BiPAP / O2  O2 Device: Room air (10/07/17 0400)    Ideal body weight: 50.1 kg (110 lb 7.2 oz)  Adjusted ideal body weight: 52.8 kg (116 lb 8.2 oz)                       Mode:            Tidal Volume:                   Pressure:                         FiO2:  70 %                    PEEP:                           PIP:       LABS:  No results for input(s): PHI, PCO2I, PO2I in the last 72 hours.     Recent Labs      10/07/17   0406  10/04/17   1905   WBC  10.5  5.8   HGB  11.4*  13.2   PLT  213  182   INR   --   1.0       Recent Labs      10/07/17   0411  10/07/17   0100   NA  134*  135*   K  3.3*  3.4*   CL  101  102   CO2  24  22   GLU  135*  144*   BUN  19  18   CREA  0.91 1.06*   CA  8.2*  7.7*   MG   --   2.1   PHOS   --   2.6   ALB   --   3.8   SGOT   --   19   ALT   --   22       IMAGING:  No results found for this or any previous visit. Results from [place] encounter on 10/04/17   CT HEAD WO CONT   Narrative EXAM:  CT HEAD WITHOUT CONTRAST  INDICATION: Pituitary apoplexy. COMPARISON: 10/5/2017. CONTRAST: None. TECHNIQUE: Unenhanced CT of the head was performed using 5 mm images. Brain and  bone windows were generated. Sagittal and coronal reformations were generated. CT dose reduction was achieved through use of a standardized protocol tailored  for this examination and automatic exposure control for dose modulation. CT dose  reduction was achieved through use of a standardized protocol tailored for this  examination and automatic exposure control for dose modulation. Adaptive  statistical iterative reconstruction (ASIR) was utilized for this examination. FINDINGS:  The ventricles and sulci are normal in size, shape and configuration and  midline. There is no significant white matter disease. There is no acute  intracranial hemorrhage. There is no extra-axial collection, mass, mass effect  or midline shift. The basilar cisterns are open. No acute infarct is  identified. The bone windows demonstrate no abnormalities. There is some  hemorrhage and fat in the sphenoid sinus. There are air-fluid levels in the  maxillary sinuses and nuchal periosteal thickening in the ethmoid sinuses with  secretions and small surgical drains in the nasopharynx. Impression IMPRESSION: Postoperative changes following transsphenoidal pituitary surgery  with fat and hemorrhage in the sphenoid sinus, air-fluid levels in the maxillary  sinuses, mucoperiosteal thickening in the ethmoid sinuses and soft tissue,  secretions and small surgical drains in the nasopharynx.       CT HEAD W CONT   Narrative EXAM:  CT HEAD W CONT    INDICATION:   pituitary cyst - pre-op planning    COMPARISON: None. CONTRAST:   100 mL of Isovue-300. TECHNIQUE:  CT of the head was performed following uneventful rapid bolus  intravenous administration of contrast.  Brain and bone windows were generated. CT dose reduction was achieved through use of a standardized protocol tailored  for this examination and automatic exposure control for dose modulation. FINDINGS: CT was performed of the brain with IV contrast following brain lab  protocol for pituitary lesion. There is a cystic pituitary mass measuring 2.5 x 2.2 x 2.9 cm with expansion and  thinning of the sella. There is a fluid fluid level within this mass and there  is rim enhancement. Mass extends into the suprasellar region. Superior to the  mass there is a 4 mm focus of enhancement most likely related to the  infundibulum. The ventricles and sulci are normal in size, shape and configuration and  symmetrical and midline. There is no significant white matter disease. . The  basilar cisterns are open. No acute infarct is identified. The visualized portions of the paranasal sinuses and mastoid air cells are  clear. Impression IMPRESSION: There is a cystic pituitary mass measuring 2.5 x 2.2 x 2.9 cm  extending into the suprasellar cistern with a fluid fluid level and rim  enhancement most consistent with cystic/necrotic pituitary macroadenoma. There  is a 5 mm focus of enhancement superior to the mass most consistent with the  compressed infundibulum. There is thinning of the bony wall of the  sella/sphenoid sinus and of the clivus. MEDICAL HISTORY:    PMH:  has a past medical history of Adverse effect of anesthesia; Arthritis; High cholesterol; Hypertension; Osteopenia; and Thyroid disease. PSH:  has a past surgical history that includes angel and bso (1980s); parathyroidectomy (12/2016); other surgical (2006); tonsillectomy (age 9); and heent.     FHX: family history includes Cancer in her maternal aunt; Heart Attack in her maternal aunt and mother; No Known Problems in her father. There is no history of Anesth Problems. SHX:  reports that she has never smoked. She has never used smokeless tobacco. She reports that she does not drink alcohol or use illicit drugs.     ALLERGIES & MEDS:    No Known Allergies    Current Facility-Administered Medications   Medication    potassium phosphate 15 mmol in 0.9% sodium chloride 250 mL infusion    hydrocortisone Sod Succ (PF) (SOLU-CORTEF) injection 50 mg    0.9% sodium chloride infusion 250 mL    sodium chloride (NS) flush 5-10 mL    sodium chloride (NS) flush 5-10 mL    acetaminophen (TYLENOL) tablet 650 mg    morphine injection 2 mg    naloxone (NARCAN) injection 0.4 mg    ondansetron (ZOFRAN) injection 4 mg    docusate sodium (COLACE) capsule 100 mg    niCARdipine (CARDENE) 25 mg in 0.9% sodium chloride 250 mL infusion    0.45% sodium chloride infusion    amLODIPine (NORVASC) tablet 5 mg    famotidine (PEPCID) tablet 20 mg    HYDROcodone-acetaminophen (NORCO) 5-325 mg per tablet 1 Tab    influenza vaccine 2017-18 (3 yrs+)(PF) (FLUZONE QUAD/FLUARIX QUAD) injection 0.5 mL        Luciano Oconnell MD

## 2017-10-07 NOTE — PROGRESS NOTES
Endocrine Chart Review,    Just following up on patients chart. Forwarded chart to Dr. Sharla Woods for endocrine f/u. Of note, patient has been mostly hyponatremic, urine output has been stable. Keep in mind that typically theres no role for DDAVP (desmporessin) or hypotonic fluids in a hyponatremic patient, as will worsen hyponatremia and can result in fluid overload, and can also aggravate an operative related swelling. DDAVP is reserved mostly for cases when DI is suggestively present with rising serum sodium above the general normal range and excessive urine output beyond normal. Post-op hyponatremia could be related to SIADH, but could also be related to a transient phase of DI where excessive release of vasopressin is released in response to pituitary stalk injury, and can be followed by general depletion of vasopressin in which the signs and symptoms of deficiency can manifest (DI) (variation of the typical tri-phasic response). Note also that in patients who do develop DI, it is better to slightly under-dose them, than to over-dose them since once it is in their system, it is difficult to correct, however when slightly under-dosed the normal physiologic mechanisms regulating thirst, and also sodium excretion work to correct and stabilize their fluid/electrolyte balance without resulting hyponatremia and its associated risks. Considerations:   Adrenal: Patient is on hydrocortisone, which can be gradually tapered, and patient could be discharged on 20mg AM, 10mg q5PM thereafter, with plan for re-evaluation in the endocrine clinic. Thyroid: The patients TSH level is innapropriately normal for her degree of hypothyroidism (low FT4), she should be started on at least 75 mcg of levothyroxine each morning to be evaluated in the outpatient clinic 6-8 weeks later.  Granted that thyroid function can be abnormal in the critically ill patient, considering the necrosis of her pituitary, thyroid hormone replacement is a good plan for now. Gonadal: Irrelevant at this time as she is post-menopausal, no acute interventions;     Growth Hormone: Irrelevant at this time, no acute interventions; Water metabolism: Need to keep a close eye on urine output and serum sodium. No role for DDAVP while sodium is <145 and urine output is normal (can aggravate surgical/operative swelling/edema, and worsen hyponatremia). This may change so need to monitor for trends. As noted above, if DDAVP started regularly, its better to under-dose than over-dose, with using the minimum required dose which allows patient to self maintain serum sodium by means of responding to thirst and drinking adequately, while at the same time maintaining a reasonable urine output. Patient to follow up in the outpatient endocrine clinic with Dr. Genia Hayes,    Thank you,    Nasir Anderson.  [address] [facility]  [address] [place] [address]

## 2017-10-08 LAB
ANION GAP SERPL CALC-SCNC: 10 MMOL/L (ref 5–15)
ANION GAP SERPL CALC-SCNC: 11 MMOL/L (ref 5–15)
ANION GAP SERPL CALC-SCNC: 9 MMOL/L (ref 5–15)
BASOPHILS # BLD: 0 K/UL (ref 0–0.1)
BASOPHILS NFR BLD: 0 % (ref 0–1)
BUN SERPL-MCNC: 10 MG/DL (ref 6–20)
BUN SERPL-MCNC: 16 MG/DL (ref 6–20)
BUN SERPL-MCNC: 9 MG/DL (ref 6–20)
BUN/CREAT SERPL: 11 (ref 12–20)
BUN/CREAT SERPL: 12 (ref 12–20)
BUN/CREAT SERPL: 13 (ref 12–20)
CALCIUM SERPL-MCNC: 8.4 MG/DL (ref 8.5–10.1)
CALCIUM SERPL-MCNC: 8.5 MG/DL (ref 8.5–10.1)
CALCIUM SERPL-MCNC: 8.6 MG/DL (ref 8.5–10.1)
CHLORIDE SERPL-SCNC: 100 MMOL/L (ref 97–108)
CHLORIDE SERPL-SCNC: 100 MMOL/L (ref 97–108)
CHLORIDE SERPL-SCNC: 102 MMOL/L (ref 97–108)
CHLORIDE UR-SCNC: 54 MMOL/L
CO2 SERPL-SCNC: 24 MMOL/L (ref 21–32)
CO2 SERPL-SCNC: 25 MMOL/L (ref 21–32)
CO2 SERPL-SCNC: 26 MMOL/L (ref 21–32)
CREAT SERPL-MCNC: 0.75 MG/DL (ref 0.55–1.02)
CREAT SERPL-MCNC: 0.95 MG/DL (ref 0.55–1.02)
CREAT SERPL-MCNC: 1.21 MG/DL (ref 0.55–1.02)
EOSINOPHIL # BLD: 0 K/UL (ref 0–0.4)
EOSINOPHIL NFR BLD: 0 % (ref 0–7)
ERYTHROCYTE [DISTWIDTH] IN BLOOD BY AUTOMATED COUNT: 12.3 % (ref 11.5–14.5)
GLUCOSE BLD STRIP.AUTO-MCNC: 126 MG/DL (ref 65–100)
GLUCOSE BLD STRIP.AUTO-MCNC: 130 MG/DL (ref 65–100)
GLUCOSE BLD STRIP.AUTO-MCNC: 136 MG/DL (ref 65–100)
GLUCOSE BLD STRIP.AUTO-MCNC: 147 MG/DL (ref 65–100)
GLUCOSE BLD STRIP.AUTO-MCNC: 209 MG/DL (ref 65–100)
GLUCOSE SERPL-MCNC: 121 MG/DL (ref 65–100)
GLUCOSE SERPL-MCNC: 132 MG/DL (ref 65–100)
GLUCOSE SERPL-MCNC: 202 MG/DL (ref 65–100)
HCT VFR BLD AUTO: 33.3 % (ref 35–47)
HGB BLD-MCNC: 11.6 G/DL (ref 11.5–16)
LYMPHOCYTES # BLD: 0.6 K/UL (ref 0.8–3.5)
LYMPHOCYTES NFR BLD: 8 % (ref 12–49)
MAGNESIUM SERPL-MCNC: 2.2 MG/DL (ref 1.6–2.4)
MCH RBC QN AUTO: 30 PG (ref 26–34)
MCHC RBC AUTO-ENTMCNC: 34.8 G/DL (ref 30–36.5)
MCV RBC AUTO: 86 FL (ref 80–99)
MONOCYTES # BLD: 0.5 K/UL (ref 0–1)
MONOCYTES NFR BLD: 6 % (ref 5–13)
NEUTS SEG # BLD: 6.7 K/UL (ref 1.8–8)
NEUTS SEG NFR BLD: 86 % (ref 32–75)
OSMOLALITY UR: 151 MOSM/KG H2O
OSMOLALITY UR: 367 MOSM/KG H2O
OSMOLALITY UR: 410 MOSM/KG H2O
OSMOLALITY UR: 444 MOSM/KG H2O
PHOSPHATE SERPL-MCNC: 2.3 MG/DL (ref 2.6–4.7)
PLATELET # BLD AUTO: 194 K/UL (ref 150–400)
POTASSIUM SERPL-SCNC: 3.1 MMOL/L (ref 3.5–5.1)
POTASSIUM SERPL-SCNC: 3.1 MMOL/L (ref 3.5–5.1)
POTASSIUM SERPL-SCNC: 3.6 MMOL/L (ref 3.5–5.1)
RBC # BLD AUTO: 3.87 M/UL (ref 3.8–5.2)
SERVICE CMNT-IMP: ABNORMAL
SODIUM SERPL-SCNC: 131 MMOL/L (ref 136–145)
SODIUM SERPL-SCNC: 134 MMOL/L (ref 136–145)
SODIUM SERPL-SCNC: 135 MMOL/L (ref 136–145)
SODIUM SERPL-SCNC: 135 MMOL/L (ref 136–145)
SODIUM SERPL-SCNC: 137 MMOL/L (ref 136–145)
SODIUM SERPL-SCNC: 138 MMOL/L (ref 136–145)
SP GR UR REFRACTOMETRY: 1 (ref 1–1.03)
WBC # BLD AUTO: 7.8 K/UL (ref 3.6–11)

## 2017-10-08 PROCEDURE — 36415 COLL VENOUS BLD VENIPUNCTURE: CPT | Performed by: INTERNAL MEDICINE

## 2017-10-08 PROCEDURE — 84295 ASSAY OF SERUM SODIUM: CPT | Performed by: INTERNAL MEDICINE

## 2017-10-08 PROCEDURE — 85025 COMPLETE CBC W/AUTO DIFF WBC: CPT | Performed by: INTERNAL MEDICINE

## 2017-10-08 PROCEDURE — 84100 ASSAY OF PHOSPHORUS: CPT | Performed by: INTERNAL MEDICINE

## 2017-10-08 PROCEDURE — 80048 BASIC METABOLIC PNL TOTAL CA: CPT | Performed by: INTERNAL MEDICINE

## 2017-10-08 PROCEDURE — 74011250637 HC RX REV CODE- 250/637: Performed by: NEUROLOGICAL SURGERY

## 2017-10-08 PROCEDURE — 81002 URINALYSIS NONAUTO W/O SCOPE: CPT | Performed by: NEUROLOGICAL SURGERY

## 2017-10-08 PROCEDURE — 74011250637 HC RX REV CODE- 250/637: Performed by: NURSE PRACTITIONER

## 2017-10-08 PROCEDURE — 74011250637 HC RX REV CODE- 250/637: Performed by: INTERNAL MEDICINE

## 2017-10-08 PROCEDURE — 74011250636 HC RX REV CODE- 250/636: Performed by: INTERNAL MEDICINE

## 2017-10-08 PROCEDURE — 74011636637 HC RX REV CODE- 636/637: Performed by: NEUROLOGICAL SURGERY

## 2017-10-08 PROCEDURE — 82962 GLUCOSE BLOOD TEST: CPT

## 2017-10-08 PROCEDURE — 83735 ASSAY OF MAGNESIUM: CPT | Performed by: INTERNAL MEDICINE

## 2017-10-08 PROCEDURE — 83935 ASSAY OF URINE OSMOLALITY: CPT | Performed by: INTERNAL MEDICINE

## 2017-10-08 PROCEDURE — 74011250637 HC RX REV CODE- 250/637: Performed by: OTOLARYNGOLOGY

## 2017-10-08 PROCEDURE — 65660000000 HC RM CCU STEPDOWN

## 2017-10-08 PROCEDURE — 74011250636 HC RX REV CODE- 250/636: Performed by: NEUROLOGICAL SURGERY

## 2017-10-08 RX ORDER — INSULIN LISPRO 100 [IU]/ML
INJECTION, SOLUTION INTRAVENOUS; SUBCUTANEOUS
Status: DISCONTINUED | OUTPATIENT
Start: 2017-10-08 | End: 2017-10-10 | Stop reason: HOSPADM

## 2017-10-08 RX ORDER — HYDRALAZINE HYDROCHLORIDE 20 MG/ML
10 INJECTION INTRAMUSCULAR; INTRAVENOUS
Status: DISCONTINUED | OUTPATIENT
Start: 2017-10-08 | End: 2017-10-10 | Stop reason: HOSPADM

## 2017-10-08 RX ORDER — SODIUM,POTASSIUM PHOSPHATES 280-250MG
2 POWDER IN PACKET (EA) ORAL 2 TIMES DAILY
Status: COMPLETED | OUTPATIENT
Start: 2017-10-08 | End: 2017-10-09

## 2017-10-08 RX ORDER — ENALAPRILAT 1.25 MG/ML
1.25 INJECTION INTRAVENOUS
Status: DISCONTINUED | OUTPATIENT
Start: 2017-10-08 | End: 2017-10-10 | Stop reason: HOSPADM

## 2017-10-08 RX ADMIN — HYDROCORTISONE SODIUM SUCCINATE 50 MG: 100 INJECTION, POWDER, FOR SOLUTION INTRAMUSCULAR; INTRAVENOUS at 00:41

## 2017-10-08 RX ADMIN — Medication 10 ML: at 14:01

## 2017-10-08 RX ADMIN — DOCUSATE SODIUM 100 MG: 100 CAPSULE, LIQUID FILLED ORAL at 17:34

## 2017-10-08 RX ADMIN — Medication 10 ML: at 00:41

## 2017-10-08 RX ADMIN — HYDROCORTISONE SODIUM SUCCINATE 50 MG: 100 INJECTION, POWDER, FOR SOLUTION INTRAMUSCULAR; INTRAVENOUS at 16:03

## 2017-10-08 RX ADMIN — POTASSIUM & SODIUM PHOSPHATES POWDER PACK 280-160-250 MG 2 PACKET: 280-160-250 PACK at 08:15

## 2017-10-08 RX ADMIN — SALINE NASAL SPRAY 2 SPRAY: 1.5 SOLUTION NASAL at 15:59

## 2017-10-08 RX ADMIN — POTASSIUM & SODIUM PHOSPHATES POWDER PACK 280-160-250 MG 2 PACKET: 280-160-250 PACK at 17:34

## 2017-10-08 RX ADMIN — HYDROCORTISONE SODIUM SUCCINATE 50 MG: 100 INJECTION, POWDER, FOR SOLUTION INTRAMUSCULAR; INTRAVENOUS at 08:15

## 2017-10-08 RX ADMIN — INSULIN LISPRO 2 UNITS: 100 INJECTION, SOLUTION INTRAVENOUS; SUBCUTANEOUS at 17:11

## 2017-10-08 RX ADMIN — Medication 10 ML: at 23:00

## 2017-10-08 RX ADMIN — Medication 10 ML: at 07:11

## 2017-10-08 RX ADMIN — HYDRALAZINE HYDROCHLORIDE 10 MG: 20 INJECTION INTRAMUSCULAR; INTRAVENOUS at 09:11

## 2017-10-08 RX ADMIN — INSULIN LISPRO 3 UNITS: 100 INJECTION, SOLUTION INTRAVENOUS; SUBCUTANEOUS at 12:07

## 2017-10-08 RX ADMIN — SODIUM CHLORIDE 50 ML/HR: 900 INJECTION, SOLUTION INTRAVENOUS at 10:07

## 2017-10-08 RX ADMIN — BACITRACIN: 500 OINTMENT TOPICAL at 10:12

## 2017-10-08 RX ADMIN — SALINE NASAL SPRAY 2 SPRAY: 1.5 SOLUTION NASAL at 23:00

## 2017-10-08 RX ADMIN — DOCUSATE SODIUM 100 MG: 100 CAPSULE, LIQUID FILLED ORAL at 08:15

## 2017-10-08 RX ADMIN — FAMOTIDINE 20 MG: 20 TABLET ORAL at 08:15

## 2017-10-08 RX ADMIN — LEVOTHYROXINE SODIUM 75 MCG: 75 TABLET ORAL at 07:10

## 2017-10-08 RX ADMIN — BACITRACIN: 500 OINTMENT TOPICAL at 17:35

## 2017-10-08 RX ADMIN — AMLODIPINE BESYLATE 5 MG: 5 TABLET ORAL at 08:15

## 2017-10-08 RX ADMIN — FAMOTIDINE 20 MG: 20 TABLET ORAL at 17:34

## 2017-10-08 NOTE — ROUTINE PROCESS
1945:  Bedside and Verbal shift change report given to Tika Eric RN (oncoming nurse) by Sheldon Gupta RN (offgoing nurse). Report included the following information SBAR, Kardex, ED Summary, OR Summary, Procedure Summary, Intake/Output, MAR, Accordion, Recent Results, Med Rec Status and Cardiac Rhythm sinus rhythm. 0730: Bedside and Verbal shift change report given to Herman Millan RN (oncoming nurse) by Tika Eric RN (offgoing nurse). Report included the following information SBAR, Kardex, ED Summary, OR Summary, Procedure Summary, Intake/Output, MAR, Accordion, Recent Results, Med Rec Status and Cardiac Rhythm sinus rhythm.

## 2017-10-08 NOTE — PROGRESS NOTES
ENDOCRINE CHART REVIEW    CONSIDERATIONS:  See prior notes,  Overnight to morning labs reviewed,  Fluid restriction alone seems to be a viable option,   Na reaching mid-130's,  Would adjust fluid restriction today to 1200 ml /day total,  If Na drops below 130 again, return to 800 ml/day total,  Also be cautious of any unusually quick rises of Na above 145 with high urine output ( DI ) which would be treated with DDAVP and removal of fluid restriction since that is a different condition and requires that she drink according to her thirst, however this does not appear to be the case at this time and will need to continue with current plan. I appreciate nephrology, pulmonary, ENT, and neurosurgical care of this patient. Concerning future discharge: Recall that we would ideally have her discharged on the tapered dose of hydrocortisone of 20mg AM & 10mg q5PM each day, in addition to the 75 mcg of levothyroxine. Whether or not she will need anything else (PO desmopressin) to be determined but not suggestive at the present time. Feel free to contact with concerns,    Ketty Robertson.  [facility]  [address] [place] [address]

## 2017-10-08 NOTE — ROUTINE PROCESS
0730 Bedside and Verbal shift change report given to Silvia Grace RN (oncoming nurse) by Sindi Daniel RN (offgoing nurse).  Report included the following information SBAR, Kardex, OR Summary, Procedure Summary, Intake/Output, MAR, Recent Results and Cardiac Rhythm SR.

## 2017-10-08 NOTE — PROGRESS NOTES
Problem: Falls - Risk of  Goal: *Absence of Falls  Document Erasmo Fall Risk and appropriate interventions in the flowsheet. Outcome: Progressing Towards Goal  Fall Risk Interventions:  Mobility Interventions: Communicate number of staff needed for ambulation/transfer, Patient to call before getting OOB, PT Consult for mobility concerns, Strengthening exercises (ROM-active/passive), Utilize walker, cane, or other assitive device           Medication Interventions: Assess postural VS orthostatic hypotension, Evaluate medications/consider consulting pharmacy, Patient to call before getting OOB, Teach patient to arise slowly     Elimination Interventions: Call light in reach, Patient to call for help with toileting needs, Toilet paper/wipes in reach, Toileting schedule/hourly rounds     History of Falls Interventions: Door open when patient unattended, Room close to nurse's station, Utilize gait belt for transfer/ambulation           Problem: Pain  Goal: *Control of Pain  Outcome: Progressing Towards Goal  Pain well controlled. Problem: Brain Tumor Day 3 to Discharge  Goal: Activity/Safety  Outcome: Progressing Towards Goal  Tolerating ambulation around unit. Goal: Nutrition/Diet  Outcome: Progressing Towards Goal  Tolerating diet. Goal: Treatments/Interventions/Procedures  Outcome: Progressing Towards Goal  Stable neuro assessment. Strict I/O- UOP evening out as NA increases, serial labs.

## 2017-10-08 NOTE — PROGRESS NOTES
2045:  Na 127. Left message at office number to have MD call. 2110:  called , listed in another note, states call [phone]. Paged MD on call. Dr Raman Jack called back. Orders received for serum osmolality, urine osmolality, urine Na, K and chloride and to call Dr Benton Padilla, who is on call for Endocrinology. 2123:  Paged Dr Benton Padilla. Advised him of Dr Kushal Stevens orders. He would like to review chart and call back. 2200:  Dr Benton Padilal called back. States he will put in orders when urine test results come back. 2206:  Spoke to Jack in the lab. Urine osmolality, Na, K, chloride  order changed to STAT. 2256:  Dr Benton Padilla called back. Will make a note with recommendations, but unable to place orders at this time. He asks if someone can be consulted to assist.  Advised him will call Dr Raman Jack. 2320:  Paged Dr Raman Jack. Advised her of Dr Shayne Florez recommendation. She is in agreeance. Orders placed in [facility]. She also asked that pharmacy be called to check availability of conivaptan. Called lab regarding urine chloride not resulting. Lab states no order in [facility]. Reordered test.     2335:  Spoke with Yohana Ngo PharmD. He states we do not carry conivaptan. He states [facility] can do oral hyponatremic medication or 3% NS. Called lab regarding urine chloride has not resulted. She will check. 0030:  Serum sodium level drawn. Neuro assessment unchanged. No complaints of pain. Updated patient on plan of care, including 800 mL/24 hours fluid restriction. Patient stated to take cup of water away. She will ask again for something to drink if she needs it. Called lab, again, about  Urine chloride results. She states no order. Reentered order for urine random chloride. 0124:  Paged Dr Raman Jack regarding Na level and increase in UOP    0127:  No new orders received from Dr Raman Jack. 0400:  Neuro assessment unchanged. Blood work drawn, urine lab work obtained.   UOP has increased the last several hours to >300 mL/hr. Dr Fuentes Fischer was made aware of this at 46. Shift summary:  Neuro intact, still diplopia but the same as before surgery. Serum Na Q 4 H 127, 131 and 134 respectively. UOP . -160. No complaints of pain. Fluid restriction 800 mL/24 hours PO intake.

## 2017-10-08 NOTE — PROGRESS NOTES
Problem: Falls - Risk of  Goal: *Absence of Falls  Document Erasmo Fall Risk and appropriate interventions in the flowsheet.    Outcome: Progressing Towards Goal  Fall Risk Interventions:  Mobility Interventions: Communicate number of staff needed for ambulation/transfer, OT consult for ADLs, Patient to call before getting OOB, PT Consult for mobility concerns, Utilize gait belt for transfers/ambulation           Medication Interventions: Evaluate medications/consider consulting pharmacy, Patient to call before getting OOB, Teach patient to arise slowly, Utilize gait belt for transfers/ambulation     Elimination Interventions: Call light in reach, Patient to call for help with toileting needs, Toileting schedule/hourly rounds     History of Falls Interventions: Door open when patient unattended, Room close to nurse's station, Utilize gait belt for transfer/ambulation           Problem: Pressure Injury - Risk of  Goal: *Prevention of pressure ulcer  Outcome: Progressing Towards Goal  Turns self about every 2 hours

## 2017-10-08 NOTE — PROGRESS NOTES
Patient being transferred    Thank you for allowing [facility] to participate in Giovanna's care. We will see again as needed.

## 2017-10-08 NOTE — ROUTINE PROCESS
TRANSFER - OUT REPORT:    Verbal report given to Brigida Voss (name) on Giovanna Self  being transferred to  [facility] (unit) for routine progression of care       Report consisted of patients Situation, Background, Assessment and   Recommendations(SBAR). Information from the following report(s) SBAR, Kardex, OR Summary, Procedure Summary, Intake/Output, MAR, Recent Results and Cardiac Rhythm SR was reviewed with the receiving nurse. Lines:   Peripheral IV 10/05/17 Right Antecubital (Active)   Site Assessment Clean, dry, & intact 10/8/2017  4:00 PM   Phlebitis Assessment 0 10/8/2017  4:00 PM   Infiltration Assessment 0 10/8/2017  4:00 PM   Dressing Status Clean, dry, & intact 10/8/2017  4:00 PM   Dressing Type Transparent;Tape 10/8/2017  4:00 PM   Hub Color/Line Status Pink;Capped 10/8/2017  4:00 PM   Action Taken Open ports on tubing capped 10/8/2017  4:00 PM   Alcohol Cap Used Yes 10/8/2017  4:00 PM       Peripheral IV 10/07/17 Left Arm (Active)   Site Assessment Clean, dry, & intact 10/8/2017  4:00 PM   Phlebitis Assessment 0 10/8/2017  4:00 PM   Infiltration Assessment 0 10/8/2017  4:00 PM   Dressing Status Clean, dry, & intact 10/8/2017  4:00 PM   Dressing Type Transparent;Tape 10/8/2017  4:00 PM   Hub Color/Line Status Pink; Infusing;Patent 10/8/2017  4:00 PM   Action Taken Open ports on tubing capped 10/8/2017  4:00 PM   Alcohol Cap Used Yes 10/8/2017  4:00 PM        Opportunity for questions and clarification was provided.       Patient transported with:   Monitor  Registered Nurse  Tech

## 2017-10-08 NOTE — PROGRESS NOTES
Endocrine Chart Review    Spoke with patients nurse Sherita Valderrama concerning patients sodium level which as been dropping throughout the day. We awaited the results of the urine studies, and although urine osm and sodium are affected by the NS received and Osm not as high as I expected, still it is higher than appropriate for her serum sodium level which is still suspicious for SIADH. As it seems however, the patient has not been fluid restricted. It does not seem that PO fluid intake has been documented throughout the day, however Rohini did note that she discussed it with the patient who reported drinking water, gingerale, cranberry juice, etc. throughout the day. Consideration:  Because medications can cause an \"over-shoot\" in serum sodium, it is first preferred a trial of fluid restriction to 800 ml/day. This is also preferred since she could also develop a DI at some point and the use of conivaptan could exaggerate the DI if she does develop it later. We can consider conivaptan for 12-24 hours if the next serum sodium drops further, to be given until sodium is restored into the mid 130's, then discontinue with trial of just fluid restriction. 1. PO Fluid restrict to 800 ml / day, can trial increase to 1200 ml/day after adequate response achieved. 2. Start documenting all PO fluid intake  3. Continue to monitor serum sodium and urine osm, q4-6 hrs preferred for timely decision making concerning medications  4. HOWEVER,  If next sodium drops further, start conivaptan (IV) with only 10mg IV loading bolus over 30 minutes, then continued at a rate of 0.83mg / hr, until serum Na reaches mid-130's, then stop, and monitor while fluid restricted at 800 ml /day with increase in daily restriction when adequate response achieved. 5. Don't forget to start levothyroxine, 75 mcg PO qAM, see prior note,    Thank you,   Welcome to reach me with concerns,    Nasir Anderson.  [address] [facility]  [address] [facility]

## 2017-10-08 NOTE — PROGRESS NOTES
RENAL  PROGRESS NOTE        Subjective:    COMPLAINT:   Awake, alert this am, no pain and in very good spirits  Objective:   VITALS SIGNS:    Visit Vitals    /81    Pulse 73    Temp 97.9 °F (36.6 °C)    Resp 14    Wt 57 kg (125 lb 9.6 oz)    SpO2 96%    BMI 22.97 kg/m2       O2 Device: Room air       Temp (24hrs), Av.1 °F (36.7 °C), Min:97.9 °F (36.6 °C), Max:98.3 °F (36.8 °C)         PHYSICAL EXAM:  WDWN WF in NARD  No rales  RRR  Abd soft  No edema    DATA REVIEW:     INTAKE / OUTPUT:   Last shift:      10/07 1901 - 10/08 0700  In: 550 [I.V.:550]  Out: 2915 [Urine:2915]  Last 3 shifts: 10/06 0701 - 10/07 1900  In: 4104.2 [I.V.:4104.2]  Out: 3115 [Urine:3105]    Intake/Output Summary (Last 24 hours) at 10/08/17 0649  Last data filed at 10/08/17 0500   Gross per 24 hour   Intake             1250 ml   Output             4225 ml   Net            -2975 ml         LABS:   Recent Labs      10/08/17   0423  10/07/17   0406   WBC  7.8  10.5   HGB  11.6  11.4*   HCT  33.3*  32.9*   PLT  194  213     Recent Labs      10/08/17   0423  10/08/17   0032  10/07/17   1932  10/07/17   1223   10/07/17   0100   10/05/17   1308   NA  134*  131*  127*  130*   < >  135*   < >  117*   K  3.1*   --   3.4*  3.8   < >  3.4*   < >  3.5   CL  100   --   94*  97   < >  102   < >  84*   CO2  25   --   22  23   < >  22   < >  21   GLU  132*   --   163*  128*   < >  144*   < >  143*   BUN  9   --   12  15   < >  18   < >  16   CREA  0.75   --   1.03*  0.83   < >  1.06*   < >  0.86   CA  8.5   --   8.1*  8.0*   < >  7.7*   < >  8.5   MG  2.2   --    --    --    --   2.1   --   1.7   PHOS  2.3*   --    --    --    --   2.6   --    --    ALB   --    --    --    --    --   3.8   --    --    TBILI   --    --    --    --    --   0.2   --    --    SGOT   --    --    --    --    --   19   --    --    ALT   --    --    --    --    --   22   --    --     < > = values in this interval not displayed.            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 6.17  Hypokalemia/hypophosphatemi    Plan:   Appreciate endo's assistance with this pt  Dr. Veronica Manuel recommendations will be followed.  PT followed by dr. Ramos Form in the outpatient setting  Thyroid replacement started  FR  neutrophos this am  UOP per hour has slowed (still had > 4 liters total)  No need for tolvaptan at this point as sodium up to 134 (conavaptan not available at [facility])  Labs ordered  OK to transfer out of icu from renal standpoint    Monique Kee MD

## 2017-10-08 NOTE — PROGRESS NOTES
Cyndi Cherry at bedside. PRN BP meds ordered. OK for transfer. 36 Dr. Keshav Kumar (ENT) at bedside. Packing from nose removed. Some drainage/bleeding to be expected. Nasal spray ordered. 1340 Updated Dr. Nohemi Mccollum over phone about labs & Dr. Pantera Sen' recommendations in AM notes. Ok w/ increasing fluid intake to 1200ml/day as long as Na is > 130. Labs adjusted to be drawn starting at 8pm.    1400 Ambulated 2 laps around unit. Tolerated well.

## 2017-10-09 LAB
ANION GAP SERPL CALC-SCNC: 11 MMOL/L (ref 5–15)
BUN SERPL-MCNC: 16 MG/DL (ref 6–20)
BUN/CREAT SERPL: 18 (ref 12–20)
CALCIUM SERPL-MCNC: 8.1 MG/DL (ref 8.5–10.1)
CHLORIDE SERPL-SCNC: 102 MMOL/L (ref 97–108)
CO2 SERPL-SCNC: 24 MMOL/L (ref 21–32)
CREAT SERPL-MCNC: 0.9 MG/DL (ref 0.55–1.02)
GLUCOSE BLD STRIP.AUTO-MCNC: 108 MG/DL (ref 65–100)
GLUCOSE BLD STRIP.AUTO-MCNC: 116 MG/DL (ref 65–100)
GLUCOSE BLD STRIP.AUTO-MCNC: 123 MG/DL (ref 65–100)
GLUCOSE BLD STRIP.AUTO-MCNC: 170 MG/DL (ref 65–100)
GLUCOSE SERPL-MCNC: 118 MG/DL (ref 65–100)
OSMOLALITY UR: 282 MOSM/KG H2O
OSMOLALITY UR: 569 MOSM/KG H2O
POTASSIUM SERPL-SCNC: 3.2 MMOL/L (ref 3.5–5.1)
SERVICE CMNT-IMP: ABNORMAL
SODIUM SERPL-SCNC: 137 MMOL/L (ref 136–145)
SP GR UR REFRACTOMETRY: 1.02 (ref 1–1.03)

## 2017-10-09 PROCEDURE — 36415 COLL VENOUS BLD VENIPUNCTURE: CPT | Performed by: INTERNAL MEDICINE

## 2017-10-09 PROCEDURE — 74011636637 HC RX REV CODE- 636/637: Performed by: NEUROLOGICAL SURGERY

## 2017-10-09 PROCEDURE — 83935 ASSAY OF URINE OSMOLALITY: CPT | Performed by: INTERNAL MEDICINE

## 2017-10-09 PROCEDURE — 81002 URINALYSIS NONAUTO W/O SCOPE: CPT | Performed by: NEUROLOGICAL SURGERY

## 2017-10-09 PROCEDURE — 74011250636 HC RX REV CODE- 250/636: Performed by: NEUROLOGICAL SURGERY

## 2017-10-09 PROCEDURE — 74011250637 HC RX REV CODE- 250/637: Performed by: NEUROLOGICAL SURGERY

## 2017-10-09 PROCEDURE — 74011250637 HC RX REV CODE- 250/637: Performed by: INTERNAL MEDICINE

## 2017-10-09 PROCEDURE — 65660000000 HC RM CCU STEPDOWN

## 2017-10-09 PROCEDURE — 82962 GLUCOSE BLOOD TEST: CPT

## 2017-10-09 PROCEDURE — 80048 BASIC METABOLIC PNL TOTAL CA: CPT | Performed by: INTERNAL MEDICINE

## 2017-10-09 PROCEDURE — 74011250637 HC RX REV CODE- 250/637: Performed by: NURSE PRACTITIONER

## 2017-10-09 RX ORDER — HYDROCORTISONE 10 MG/1
10 TABLET ORAL
Status: DISCONTINUED | OUTPATIENT
Start: 2017-10-09 | End: 2017-10-10 | Stop reason: HOSPADM

## 2017-10-09 RX ORDER — HYDROCORTISONE 10 MG/1
20 TABLET ORAL
Status: DISCONTINUED | OUTPATIENT
Start: 2017-10-09 | End: 2017-10-10 | Stop reason: HOSPADM

## 2017-10-09 RX ORDER — POTASSIUM CHLORIDE 750 MG/1
20 TABLET, FILM COATED, EXTENDED RELEASE ORAL 2 TIMES DAILY
Status: COMPLETED | OUTPATIENT
Start: 2017-10-09 | End: 2017-10-09

## 2017-10-09 RX ORDER — HYDROCORTISONE 10 MG/1
10 TABLET ORAL
Status: DISCONTINUED | OUTPATIENT
Start: 2017-10-09 | End: 2017-10-09

## 2017-10-09 RX ADMIN — Medication 10 ML: at 06:35

## 2017-10-09 RX ADMIN — BACITRACIN: 500 OINTMENT TOPICAL at 09:00

## 2017-10-09 RX ADMIN — DOCUSATE SODIUM 100 MG: 100 CAPSULE, LIQUID FILLED ORAL at 17:30

## 2017-10-09 RX ADMIN — SALINE NASAL SPRAY 2 SPRAY: 1.5 SOLUTION NASAL at 09:19

## 2017-10-09 RX ADMIN — POTASSIUM & SODIUM PHOSPHATES POWDER PACK 280-160-250 MG 2 PACKET: 280-160-250 PACK at 17:29

## 2017-10-09 RX ADMIN — LEVOTHYROXINE SODIUM 75 MCG: 75 TABLET ORAL at 06:34

## 2017-10-09 RX ADMIN — FAMOTIDINE 20 MG: 20 TABLET ORAL at 17:31

## 2017-10-09 RX ADMIN — AMLODIPINE BESYLATE 5 MG: 5 TABLET ORAL at 09:17

## 2017-10-09 RX ADMIN — HYDRALAZINE HYDROCHLORIDE 10 MG: 20 INJECTION INTRAMUSCULAR; INTRAVENOUS at 15:33

## 2017-10-09 RX ADMIN — DOCUSATE SODIUM 100 MG: 100 CAPSULE, LIQUID FILLED ORAL at 09:17

## 2017-10-09 RX ADMIN — HYDROCORTISONE 10 MG: 10 TABLET ORAL at 17:31

## 2017-10-09 RX ADMIN — SALINE NASAL SPRAY 2 SPRAY: 1.5 SOLUTION NASAL at 17:32

## 2017-10-09 RX ADMIN — FAMOTIDINE 20 MG: 20 TABLET ORAL at 09:16

## 2017-10-09 RX ADMIN — Medication 10 ML: at 15:19

## 2017-10-09 RX ADMIN — HYDROCORTISONE 20 MG: 10 TABLET ORAL at 09:16

## 2017-10-09 RX ADMIN — HYDROCORTISONE SODIUM SUCCINATE 50 MG: 100 INJECTION, POWDER, FOR SOLUTION INTRAMUSCULAR; INTRAVENOUS at 00:44

## 2017-10-09 RX ADMIN — POTASSIUM & SODIUM PHOSPHATES POWDER PACK 280-160-250 MG 2 PACKET: 280-160-250 PACK at 09:17

## 2017-10-09 RX ADMIN — BACITRACIN: 500 OINTMENT TOPICAL at 18:00

## 2017-10-09 RX ADMIN — Medication 10 ML: at 23:14

## 2017-10-09 RX ADMIN — POTASSIUM CHLORIDE 20 MEQ: 750 TABLET, FILM COATED, EXTENDED RELEASE ORAL at 17:30

## 2017-10-09 RX ADMIN — POTASSIUM CHLORIDE 20 MEQ: 750 TABLET, FILM COATED, EXTENDED RELEASE ORAL at 09:16

## 2017-10-09 RX ADMIN — SALINE NASAL SPRAY 2 SPRAY: 1.5 SOLUTION NASAL at 23:15

## 2017-10-09 RX ADMIN — INSULIN LISPRO 2 UNITS: 100 INJECTION, SOLUTION INTRAVENOUS; SUBCUTANEOUS at 09:15

## 2017-10-09 RX ADMIN — HYDRALAZINE HYDROCHLORIDE 10 MG: 20 INJECTION INTRAMUSCULAR; INTRAVENOUS at 02:35

## 2017-10-09 NOTE — PROGRESS NOTES
Primary Nurse Fifi Deutsch and Diane Patton RN performed a dual skin assessment on this patient Impairment noted- see wound doc flow sheet  Jose Rafael score is 20. Bedside and Verbal shift change report given to Diane Patton RN (oncoming nurse) by Anyi Solis RN (offgoing nurse). Report included the following information SBAR, Kardex, Intake/Output, MAR, Accordion, Recent Results and Cardiac Rhythm NSR.

## 2017-10-09 NOTE — PROGRESS NOTES
Neurosurgery Progress Note  Sherlyn Saini  [phone]        Admit Date: 10/4/2017   LOS: 5 days        Daily Progress Note: 10/9/2017    POD:3 Days Post-Op    S/P: Procedure(s):  CT BRAIN LAB GUIDED TRANSPHENOIDAL REMOVAL PITUITARY TUMOR   ABDOMINAL FAT GRAFTING    Subjective: The patient underwent resection of a large pituitary tumor on 10/06/17 by Dr. Luis Benson. This morning she states her vision is much improved as well as her headaches. She is being followed by renal for her sodium issues. Nasal packs removed yesterday by Dr. Katiana Zamarripa. Denies numbness, tingling, chest pain, leg pain, nausea, vomiting, difficulty swallowing,  and dyspnea. Objective:     Vital signs  Temp (24hrs), Av °F (36.7 °C), Min:97.6 °F (36.4 °C), Max:98.2 °F (36.8 °C)      10/07 1901 - 10/09 0700  In: 2748 [P.O.:1198; I.V.:1550]  Out: 6060 [Urine:6060]    Visit Vitals    /73 (BP 1 Location: Right arm, BP Patient Position: At rest)    Pulse 82    Temp 98.2 °F (36.8 °C)    Resp 11    Ht 5' 2\" (1.575 m)    Wt 60.4 kg (133 lb 3.2 oz)    SpO2 98%    BMI 24.36 kg/m2      O2 Device: Room air     Pain control  Pain Assessment  Pain Scale 1: Numeric (0 - 10)  Pain Intensity 1: 0  Pain Onset 1:  (IV Potassium)  Pain Location 1: Arm  Pain Orientation 1: Lower  Pain Description 1: Aching  Pain Intervention(s) 1: Rest, Relaxation technique    PT/OT  Gait                 Physical Exam:  Gen:NAD. Neuro: A&Ox3. Follows commands. Speech clear. Affect normal.  PERRL. EOMI. Face symmetric. Palate symmetric. Tongue midline. NAZARIO. Strength 5/5 in UE and LE BL. Negative drift. Gait deferred.       24 hour results:    Recent Results (from the past 24 hour(s))   GLUCOSE, POC    Collection Time: 10/08/17  5:07 PM   Result Value Ref Range    Glucose (POC) 147 (H) 65 - 100 mg/dL    Performed by Alvah Silver, UR    Collection Time: 10/08/17  6:36 PM   Result Value Ref Range    Osmolality,urine 410 MOSM/kg K1Q   METABOLIC PANEL, BASIC    Collection Time: 10/08/17  8:49 PM   Result Value Ref Range    Sodium 138 136 - 145 mmol/L    Potassium 3.6 3.5 - 5.1 mmol/L    Chloride 102 97 - 108 mmol/L    CO2 26 21 - 32 mmol/L    Anion gap 10 5 - 15 mmol/L    Glucose 121 (H) 65 - 100 mg/dL    BUN 16 6 - 20 MG/DL    Creatinine 1.21 (H) 0.55 - 1.02 MG/DL    BUN/Creatinine ratio 13 12 - 20      GFR est AA 53 (L) >60 ml/min/1.73m2    GFR est non-AA 44 (L) >60 ml/min/1.73m2    Calcium 8.6 8.5 - 10.1 MG/DL   SODIUM    Collection Time: 10/08/17  8:49 PM   Result Value Ref Range    Sodium 137 136 - 145 mmol/L   GLUCOSE, POC    Collection Time: 10/08/17  9:00 PM   Result Value Ref Range    Glucose (POC) 136 (H) 65 - 100 mg/dL    Performed by [employer], UR    Collection Time: 10/09/17 12:55 AM   Result Value Ref Range    Osmolality,urine 282 MOSM/kg I9G   METABOLIC PANEL, BASIC    Collection Time: 10/09/17  5:29 AM   Result Value Ref Range    Sodium 137 136 - 145 mmol/L    Potassium 3.2 (L) 3.5 - 5.1 mmol/L    Chloride 102 97 - 108 mmol/L    CO2 24 21 - 32 mmol/L    Anion gap 11 5 - 15 mmol/L    Glucose 118 (H) 65 - 100 mg/dL    BUN 16 6 - 20 MG/DL    Creatinine 0.90 0.55 - 1.02 MG/DL    BUN/Creatinine ratio 18 12 - 20      GFR est AA >60 >60 ml/min/1.73m2    GFR est non-AA >60 >60 ml/min/1.73m2    Calcium 8.1 (L) 8.5 - 10.1 MG/DL   SPECIFIC GRAVITY, UR    Collection Time: 10/09/17  6:08 AM   Result Value Ref Range    Specific gravity 1.017 1.003 - 1.030     OSMOLALITY, UR    Collection Time: 10/09/17  6:08 AM   Result Value Ref Range    Osmolality,urine 569 MOSM/kg H2O   GLUCOSE, POC    Collection Time: 10/09/17  8:49 AM   Result Value Ref Range    Glucose (POC) 170 (H) 65 - 100 mg/dL    Performed by Danyelle Gloria    GLUCOSE, POC    Collection Time: 10/09/17 11:57 AM   Result Value Ref Range    Glucose (POC) 108 (H) 65 - 100 mg/dL    Performed by Taurus Vazquez           Assessment:     Active Problems: Pituitary adenoma ([facility] [address] (10/4/2017)        Plan:   1. Pituitary adenoma   - s/p TSS 10/06   - Cont Cortef   - Nasal packs removed 10/08   - Normalize and mobilize  2. Optic chiasm compression   - due to #1   - plans as above  3. SIADH   - Renal following  4.  HTN   - Cont norvasc from home    Activity: up with assist  DVT ppx: SCDs  Dispo: home likely tomorrow    Plan d/w Dr. Stew Perrin, NP

## 2017-10-09 NOTE — ROUTINE PROCESS
Bedside and Verbal shift change report given to Juan Luis CHATTERJEE (oncoming nurse) by Melissa Ayala (offgoing nurse). Report included the following information SBAR, Kardex, ED Summary, Procedure Summary, Intake/Output, MAR, Accordion, Recent Results, Med Rec Status and Cardiac Rhythm NSR/Sinus Tach.

## 2017-10-09 NOTE — INTERDISCIPLINARY ROUNDS
IDR/SLIDR Summary          Patient: Iain Potts MRN: [id number]    Age: 67 y.o. YOB: 1945 [facility]   Admit Diagnosis: Pituitary Tumor  [facility]  PITUITARY TUMOR   Principal Diagnosis: <principal problem not specified>   Goals: Monitor Na and Urine Osmality  Readmission: NO  Quality Measure: Not applicable  VTE Prophylaxis: Mechanical  Influenza Vaccine screening completed? YES  Pneumococcal Vaccine screening completed? YES  Mobility needs: No   Nutrition plan:Yes  Consults: P. T and O.T. Financial concerns:No  Escalated to CM? YES  RRAT Score: 16   Interventions:Home  Testing due for pt today?  NO  LOS: 5 days Expected length of stay 3 days  Discharge plan: TBD   PCP: Marge Villanueva MD  Transportation needs: No    Days before discharge:one day until discharge   Discharge disposition: Home    Signed:     Zeferino Light RN  10/08/17  0800 am

## 2017-10-09 NOTE — PROGRESS NOTES
RENAL  PROGRESS NOTE        Subjective:    COMPLAINT:   \"I feel better. \"  No N/V. No dyspnea. No HA. Objective:   VITALS SIGNS:    Visit Vitals    /79    Pulse 81    Temp 98 °F (36.7 °C)    Resp 15    Ht 5' 2\" (1.575 m)    Wt 60.4 kg (133 lb 3.2 oz)    SpO2 96%    BMI 24.36 kg/m2       O2 Device: Room air       Temp (24hrs), Av.8 °F (36.6 °C), Min:97.5 °F (36.4 °C), Max:98 °F (36.7 °C)         PHYSICAL EXAM:  WDWN WF in NARD  No rales  RRR  Abd soft  No edema    DATA REVIEW:     INTAKE / OUTPUT:   Last shift:         Last 3 shifts: 10/07 1901 - 10/09 07  In: 1686 [P.O.:1198; I.V.:1550]  Out: 6060 [Urine:6060]    Intake/Output Summary (Last 24 hours) at 10/09/17 1116  Last data filed at 10/09/17 0700   Gross per 24 hour   Intake             1800 ml   Output             2585 ml   Net             -785 ml         LABS:   Recent Labs      10/08/17   0423  10/07/17   0406   WBC  7.8  10.5   HGB  11.6  11.4*   HCT  33.3*  32.9*   PLT  194  213     Recent Labs      10/09/17   0529  10/08/17   2049  10/08/17   1209   10/08/17   0423   10/07/17   0100   NA  137  138  137  135*   < >  134*   < >  135*   K  3.2*  3.6  3.1*   --   3.1*   < >  3.4*   CL  102  102  100   --   100   < >  102   CO2  24  26  24   --   25   < >  22   GLU  118*  121*  202*   --   132*   < >  144*   BUN  16  16  10   --   9   < >  18   CREA  0.90  1.21*  0.95   --   0.75   < >  1.06*   CA  8.1*  8.6  8.4*   --   8.5   < >  7.7*   MG   --    --    --    --   2.2   --   2.1   PHOS   --    --    --    --   2.3*   --   2.6   ALB   --    --    --    --    --    --   3.8   TBILI   --    --    --    --    --    --   0.2   SGOT   --    --    --    --    --    --   19   ALT   --    --    --    --    --    --   22    < > = values in this interval not displayed. 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     Hx of 2 Parathyroid adenoma resction in 12.2016    HTN    s.p Pituitary mass  Surgery on 10. 6.17    I will sign off. Please call if any questions.         Luis Johnson MD

## 2017-10-10 VITALS
TEMPERATURE: 97.6 F | HEART RATE: 85 BPM | BODY MASS INDEX: 23.52 KG/M2 | HEIGHT: 62 IN | SYSTOLIC BLOOD PRESSURE: 161 MMHG | OXYGEN SATURATION: 98 % | WEIGHT: 127.8 LBS | DIASTOLIC BLOOD PRESSURE: 66 MMHG | RESPIRATION RATE: 15 BRPM

## 2017-10-10 LAB
ANION GAP SERPL CALC-SCNC: 11 MMOL/L (ref 5–15)
BUN SERPL-MCNC: 16 MG/DL (ref 6–20)
BUN/CREAT SERPL: 21 (ref 12–20)
CALCIUM SERPL-MCNC: 8.1 MG/DL (ref 8.5–10.1)
CHLORIDE SERPL-SCNC: 99 MMOL/L (ref 97–108)
CO2 SERPL-SCNC: 25 MMOL/L (ref 21–32)
CREAT SERPL-MCNC: 0.77 MG/DL (ref 0.55–1.02)
GLUCOSE BLD STRIP.AUTO-MCNC: 130 MG/DL (ref 65–100)
GLUCOSE SERPL-MCNC: 89 MG/DL (ref 65–100)
OSMOLALITY UR: 525 MOSM/KG H2O
POTASSIUM SERPL-SCNC: 3.4 MMOL/L (ref 3.5–5.1)
SERVICE CMNT-IMP: ABNORMAL
SODIUM SERPL-SCNC: 135 MMOL/L (ref 136–145)
SP GR UR REFRACTOMETRY: 1.01 (ref 1–1.03)

## 2017-10-10 PROCEDURE — 80048 BASIC METABOLIC PNL TOTAL CA: CPT | Performed by: INTERNAL MEDICINE

## 2017-10-10 PROCEDURE — 74011250637 HC RX REV CODE- 250/637: Performed by: NEUROLOGICAL SURGERY

## 2017-10-10 PROCEDURE — 81002 URINALYSIS NONAUTO W/O SCOPE: CPT | Performed by: NEUROLOGICAL SURGERY

## 2017-10-10 PROCEDURE — 74011250637 HC RX REV CODE- 250/637: Performed by: NURSE PRACTITIONER

## 2017-10-10 PROCEDURE — 74011250636 HC RX REV CODE- 250/636: Performed by: NEUROLOGICAL SURGERY

## 2017-10-10 PROCEDURE — 36415 COLL VENOUS BLD VENIPUNCTURE: CPT | Performed by: INTERNAL MEDICINE

## 2017-10-10 PROCEDURE — 74011250637 HC RX REV CODE- 250/637: Performed by: INTERNAL MEDICINE

## 2017-10-10 PROCEDURE — 82962 GLUCOSE BLOOD TEST: CPT

## 2017-10-10 PROCEDURE — 83935 ASSAY OF URINE OSMOLALITY: CPT | Performed by: INTERNAL MEDICINE

## 2017-10-10 RX ORDER — HYDROCORTISONE 10 MG/1
TABLET ORAL
Qty: 90 TAB | Refills: 1 | Status: SHIPPED | OUTPATIENT
Start: 2017-10-10 | End: 2018-02-01

## 2017-10-10 RX ORDER — AMLODIPINE BESYLATE 5 MG/1
7.5 TABLET ORAL DAILY
Qty: 45 TAB | Refills: 0 | Status: SHIPPED | OUTPATIENT
Start: 2017-10-10 | End: 2018-05-21 | Stop reason: SINTOL

## 2017-10-10 RX ORDER — HYDROCODONE BITARTRATE AND ACETAMINOPHEN 5; 325 MG/1; MG/1
1 TABLET ORAL
Qty: 22 TAB | Refills: 0 | Status: SHIPPED | OUTPATIENT
Start: 2017-10-10 | End: 2018-02-01

## 2017-10-10 RX ORDER — LEVOTHYROXINE SODIUM 75 UG/1
75 TABLET ORAL
Qty: 30 TAB | Refills: 1 | Status: SHIPPED | OUTPATIENT
Start: 2017-10-10 | End: 2017-12-06 | Stop reason: SDUPTHER

## 2017-10-10 RX ORDER — AMLODIPINE BESYLATE 5 MG/1
5 TABLET ORAL DAILY
Status: DISCONTINUED | OUTPATIENT
Start: 2017-10-10 | End: 2017-10-10 | Stop reason: SDUPTHER

## 2017-10-10 RX ORDER — AMLODIPINE BESYLATE 5 MG/1
2.5 TABLET ORAL ONCE
Status: COMPLETED | OUTPATIENT
Start: 2017-10-10 | End: 2017-10-10

## 2017-10-10 RX ADMIN — SALINE NASAL SPRAY 2 SPRAY: 1.5 SOLUTION NASAL at 09:51

## 2017-10-10 RX ADMIN — LEVOTHYROXINE SODIUM 75 MCG: 75 TABLET ORAL at 06:51

## 2017-10-10 RX ADMIN — DOCUSATE SODIUM 100 MG: 100 CAPSULE, LIQUID FILLED ORAL at 09:45

## 2017-10-10 RX ADMIN — AMLODIPINE BESYLATE 2.5 MG: 5 TABLET ORAL at 15:20

## 2017-10-10 RX ADMIN — HYDRALAZINE HYDROCHLORIDE 10 MG: 20 INJECTION INTRAMUSCULAR; INTRAVENOUS at 03:27

## 2017-10-10 RX ADMIN — HYDRALAZINE HYDROCHLORIDE 10 MG: 20 INJECTION INTRAMUSCULAR; INTRAVENOUS at 11:03

## 2017-10-10 RX ADMIN — Medication 10 ML: at 06:51

## 2017-10-10 RX ADMIN — AMLODIPINE BESYLATE 5 MG: 5 TABLET ORAL at 09:47

## 2017-10-10 RX ADMIN — FAMOTIDINE 20 MG: 20 TABLET ORAL at 09:45

## 2017-10-10 RX ADMIN — HYDROCORTISONE 20 MG: 10 TABLET ORAL at 07:59

## 2017-10-10 NOTE — PROGRESS NOTES
Discharge medications reviewed with patient and appropriate educational materials and side effects teaching were provided. I have reviewed discharge instructions with the patient. The patient verbalized understanding. PIV removed prior to discharge. Patient's  will be transferring patient home in personal vehicle. 5 Medication prescriptions signed by Aniket Prieto NP and placed in in patient discharge folder. Patient refused flu vaccine at discharge. Patient received 2.5mg po of Novasc prior to discharge. New medication order is for patient to receive 7.5mg of Norvasc po daily. Side affects of Norvasc reviewed with patient. Medication information sheet given to patient. Information booklet regarding Brain Tumors, Headaches and Dizziness given to patient as well.

## 2017-10-10 NOTE — ROUTINE PROCESS
Bedside shift change report given to Hay Donnelly (oncoming nurse) by Dolores Ruffin (offgoing nurse). Report included the following information SBAR, ED Summary, OR Summary, Procedure Summary, MAR, Recent Results and Cardiac Rhythm NSR.

## 2017-10-10 NOTE — PROGRESS NOTES
Problem: Falls - Risk of  Goal: *Absence of Falls  Document Erasmo Fall Risk and appropriate interventions in the flowsheet.    Outcome: Progressing Towards Goal  Fall Risk Interventions:  Mobility Interventions: Bed/chair exit alarm, Communicate number of staff needed for ambulation/transfer, OT consult for ADLs, Patient to call before getting OOB, PT Consult for mobility concerns, Utilize gait belt for transfers/ambulation           Medication Interventions: Assess postural VS orthostatic hypotension, Bed/chair exit alarm, Evaluate medications/consider consulting pharmacy, Patient to call before getting OOB, Teach patient to arise slowly     Elimination Interventions: Call light in reach, Bed/chair exit alarm, Patient to call for help with toileting needs     History of Falls Interventions: Evaluate medications/consider consulting pharmacy, Door open when patient unattended

## 2017-10-10 NOTE — DISCHARGE INSTRUCTIONS
Diet - as tolerated. Continue 1200 ml fluid restriction per 24 hours. This should be continued for the next few weeks until you see your endocrine doctor. Activity - Keep head elevated on 2-3 pillows for 2-3 weeks. No driving while on pain medication. No bending over, lifting greater than 5 pounds. Wound care - continue to use saline nasal spray three times a day. It is ok to shower. If you develop a fever greater than 100.5, severe nausea, vomiting, severe neck stiffness, shortness of breath, chest pain, leg pain, leg swelling, call the MD at [phone]. Someone is on call 24/7. If you have trouble getting into the endocrinologist office in less than a month, please call Dr. Shantell Edwards office. It is important that you take your blood pressure medication as prescribed. You should follow-up with your PCP in 1 week to discuss your BP medication. You should get a home blood pressure cuff and check your blood pressure in the morning and in the evening and keep a log that you can take to your doctor's visit. You have been given prescriptions for the following medications:  1. Cortef - take 20 mg in the am and 10 mg at 5PM - this is a steroid. It augments your body's natural steroid production since the pituitary gland function has been affected by the tumor that was removed. Take them medication as directed until you see your endocrinologist to determine if you need to continue to take this medication. 2. Levothyroxine 75 mcg - this medication supplements your thyroid hormone. Take this medication as prescribed until you see your endocrine doctor to determine if you need to continue this replacement. 3. Amlodipine 7.5 mg - this is an increase in the dosage of your home blood pressure medication. The dosage equals 1.5 tabs of the BP pills you have at home. 4. Norco 5/325 mg - this is a pain medication. Take this medication as needed. This medication has 325 mg of Tylenol in it.  You have a maximum amount of 4000 mg of Tylenol in a 24 hour period. You may take plain tylenol in between doses of this medication, but will need to keep track of the Tylenol amount. 5. Saline nasal spray - 2 sprays both nostrils three times a day - use as directed. This helps to moisten the nasal passageways. Cont this medication until you follow-up with Dr. Doni Barrios.

## 2017-10-10 NOTE — DISCHARGE SUMMARY
Discharge Summary     Patient ID:  Alvin Chávez  [id number]   30 y.o.  1945    Admit date: 10/4/2017    Discharge Date: 10/10/2017      Admitting Physician: Niels Cramer MD     Discharge Physician: Afshin Vela NP    Admission Diagnoses: Pituitary Tumor  Pituitary adenoma [facility])  PITUITARY TUMOR     Last Procedure: Procedure(s):  CT BRAIN LAB GUIDED TRANSPHENOIDAL REMOVAL PITUITARY TUMOR   ABDOMINAL FAT GRAFTING    Discharge Diagnoses: Active Problems:    Pituitary adenoma ([address] (10/4/2017)         Consults:   1. Nephrology  2. Endocrinology  3. ENT      Significant Diagnostic Studies:  1. MRI brain with and without contrast on 10/03/17 shows large pituitary mass containing a fluid/fluid level suggesting possible old hemorrhage into a pituitary macroadenoma. While Rathke cleft cyst may also be in the differential, it is less likely and not favored. See dimensions and details above. Microvascular ischemic and other age-related change. 2. CT head without contrast on 10/07/17 shows postoperative changes following transsphenoidal pituitary surgery with fat and hemorrhage in the sphenoid sinus, air-fluid levels in the maxillary sinuses, mucoperiosteal thickening in the ethmoid sinuses and soft tissue, secretions and small surgical drains in the nasopharynx. Patient condition upon discharge: Stable    Hospital Course:   Yue Sierra is a 67 y.o. female who presents with a pituitary mass. The patient  Has not been feeling well for the past year. She states she has been feeling tired and not her usual self for about the past 3 years now, more specifically the last 18 months. She saw an endocrinologist who diagnosed her with low vitamin D levels. She was also found to have enlarged parathyroid glands. She saw Dr. Jes Gallegos who removed 2 enlarged parathyroid glands in 12/2016. The patient states after this surgery, she felt a lot better for about 2 months. Then she got a respiratory virus and never bounced back.  She states 2 weeks ago she developed headaches. She saw her endocrinologist who ordered a head CT. The patient was found to have a pituitary cyst. Only axial images were visible on this scan. She was referred to Dr. Radha Edge who ordered an MRI. 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 She was directly admitted to the hospital last night with plans for surgery tomorrow. Her admission labs did reveal a sodium level of 122. This was repeated and found to be 121. The patient's sodium level on 09/18/17 was 139 and she has no evidence of hyponatremia on prior labs. She did vomit yesterday. She denies recent change in weight, abnormal hair growth, change in ring size or shoe size. Her headache has worsened over the past 4 days. She has never had sinus surgery or nasal surgery. She underwent a transsphenoidal resection of her pituitary adenoma with fat graft with Dr. Medardo Beckwith and Dr. Lilly Wright on 10/06/17. The intraoperative findings can be found in the operative report. The pathology was consistent with a pituitary adenoma with some secretion of FSH. Post-operatively, she was transferred to the ICU. Her vital signs were stable and she was afebrile. She was transferred to [facility]. She was taking PO well, voiding on her own, and ambulating without assistance. Her nasal packing was discontinued on POD2. She showed no signs of a CSF leak. Adjustments were made to her sodium and she was maintained on a fluid restriction at discharge. She was also discharged on physiologic doses of Cortef. She will need to follow closely with endocrine. She will follow-up with ENT in 10 days and see Dr. Radha Edge in 2-3 weeks. She will also need to see her PCP in 1 week. I increased her blood pressure medication and have instructed her to keep a log of her BP in the am and evening so she can take it with her to her PCP's appt.  Discharge instructions have been discussed with the patient and her  by myself. Disposition: Home    Patient Instructions:   Current Discharge Medication List      START taking these medications    Details   HYDROcodone-acetaminophen (NORCO) 5-325 mg per tablet Take 1 Tab by mouth every four (4) hours as needed. Max Daily Amount: 6 Tabs. Qty: 22 Tab, Refills: 0      hydrocortisone (CORTEF) 10 mg tablet Take 2 tabs PO every morning and 1 tab PO at 5PM  Qty: 90 Tab, Refills: 1      levothyroxine (SYNTHROID) 75 mcg tablet Take 1 Tab by mouth Daily (before breakfast). Qty: 30 Tab, Refills: 1      sodium chloride (OCEAN) 0.65 % nasal spray 2 Sprays by Both Nostrils route three (3) times daily for 10 days. Qty: 3 mL, Refills: 0         CONTINUE these medications which have CHANGED    Details   amLODIPine (NORVASC) 5 mg tablet Take 1.5 Tabs by mouth daily. Qty: 45 Tab, Refills: 0         CONTINUE these medications which have NOT CHANGED    Details   docusate sodium (COLACE) 50 mg capsule Take 2 Caps by mouth two (2) times daily as needed for Constipation. Qty: 30 Cap, Refills: 2      cholecalciferol, vitamin D3, 2,000 unit tab Take  by mouth. Diet: Reference my discharge instructions. Activity: Reference my discharge instructions. EXAM:   Gen:NAD. Neuro: A&Ox3. Follows commands. Speech clear. Affect normal.  PERRL. EOMI. Face symmetric. Palate symmetric. Tongue midline. NAZARIO. Strength 5/5 in UE and LE BL. Negative drift. Gait deferred. Heart RRR  Lungs CTA BL  Abd soft, NT. Normal bowel sounds. Incision to right lower quadrant is C/D/I with dermabond in place. Follow-up Appointments   Procedures    FOLLOW UP VISIT Appointment in: One Month     Standing Status:   Standing     Number of Occurrences:   1     Order Specific Question:   Appointment in     Answer:   One Month        Total time discharging patient took greater than 30 minutes.     Signed:  Jose Ulloa NP  October 10, 2017  3:20 PM

## 2017-10-11 ENCOUNTER — PATIENT OUTREACH (OUTPATIENT)
Dept: ENDOCRINOLOGY | Age: 72
End: 2017-10-11

## 2017-10-12 ENCOUNTER — TELEPHONE (OUTPATIENT)
Dept: ENDOCRINOLOGY | Age: 72
End: 2017-10-12

## 2017-10-12 DIAGNOSIS — D35.2 PITUITARY ADENOMA (HCC): Primary | ICD-10-CM

## 2017-10-12 NOTE — TELEPHONE ENCOUNTER
Patient is scheduled for 10/18/2017 (follow up from hospital.    She is calling to inquire as to whether she has lab work   before the visit? Patient contact: [phone].

## 2017-10-13 NOTE — TELEPHONE ENCOUNTER
Spoke with pt. I instructed her to hold her PM dose of HC on Tuesday 10/17/17 and to not take her AM dose of HC on Wednesday 10/18/17. Pt to go the the lab for testing at [address] [place] [address] the morning of 10/18/17 and after she has her labs drawn to take her [facility] and to resume taking her HC. Will order ACTH, Cortisol, Usom, Posm, BMP, UA, TSH, FT4, IGF-1, Prolactin, FSH/LH. Pt voices understanding and agreement with the plan.

## 2017-10-18 ENCOUNTER — OFFICE VISIT (OUTPATIENT)
Dept: ENDOCRINOLOGY | Age: 72
End: 2017-10-18

## 2017-10-18 VITALS
BODY MASS INDEX: 22.97 KG/M2 | DIASTOLIC BLOOD PRESSURE: 68 MMHG | HEART RATE: 66 BPM | SYSTOLIC BLOOD PRESSURE: 128 MMHG | HEIGHT: 62 IN | WEIGHT: 124.8 LBS

## 2017-10-18 DIAGNOSIS — E55.9 HYPOVITAMINOSIS D: ICD-10-CM

## 2017-10-18 DIAGNOSIS — D35.2 PITUITARY ADENOMA (HCC): Primary | ICD-10-CM

## 2017-10-18 DIAGNOSIS — E21.0 PRIMARY HYPERPARATHYROIDISM (HCC): ICD-10-CM

## 2017-10-18 NOTE — PROGRESS NOTES
Chief Complaint   Patient presents with    Pituitary Problem     pcp and pharmacy verified. Had labs drawn this am.   Record since last visit reviewed  History of Present Illness: Nubia Sierra is a 67 y.o. female here for follow up of primary hyperparathyroidism and pituitary adenoma s/p resection. On 10/4/17 she presented to the ED with HA and imaging studies showed a large pituitary mass. She was taken to the OR by Radha Mac and Dr. Manuel Whitten and had resection of the mass. The pathology was read as benign and showed [address] activity only. She tolerated the post-op hospitalization and was discharged home on LT4 75mcg daily and HC 10mg in the AM and 5mg with dinner. Pt notes she is feeling wonderful. She denies issues of lightheadedness, dizziness, no more HAs, palpitations, CP, SOB, syncope, or pre-syncope. She denies issues of perioral numbness or numbness in her hands and fingers. She brought a list of her BPs which have been in the 110's-140's/60's-80's. Pt notes she is keeping up with moderate activity, though she will take a nap in the mid-afternoon. She dose note that at night she will sometimes be restless an does not sleep through the night. For her hyperparathyroidism a our initial visit our work up included imaging which showed an oval solid nodule contiguous with the lower pole of the left   thyroid lobe compatible with enlarged parathyroid, and correlating with   today's nuclear imaging. It measures approximately 1.6 x 0.7 x 0.6 cm. The initial images demonstrate an asymmetric focus of increased radiotracer   localization adjacent to the lower pole of the left thyroid lobe. The delayed images demonstrate persistent radiotracer focal retention, left   Lower. Pt was referred to Dr. Rosalee Oviedo, who took her to the OR on 12/2/16 and found two enlarged parathyroid glands (Inferior Left and Superior Right). These were resected and sent to pathology.  They were both read as benign parathyroid adenoma. Her pre-op PTH was 242 and her post-op PTH was 7.3. Her Ca in the hospital on 10/8/17 was 8.5. Pt has hx of osteopenia, but not osteoporosis. Current Outpatient Prescriptions   Medication Sig    amLODIPine (NORVASC) 5 mg tablet Take 1.5 Tabs by mouth daily.  hydrocortisone (CORTEF) 10 mg tablet Take 2 tabs PO every morning and 1 tab PO at 5PM    levothyroxine (SYNTHROID) 75 mcg tablet Take 1 Tab by mouth Daily (before breakfast).  sodium chloride (OCEAN) 0.65 % nasal spray 2 Sprays by Both Nostrils route three (3) times daily for 10 days.  docusate sodium (COLACE) 50 mg capsule Take 2 Caps by mouth two (2) times daily as needed for Constipation. (Patient taking differently: Take 100 mg by mouth daily.)    cholecalciferol, vitamin D3, 2,000 unit tab Take  by mouth.  HYDROcodone-acetaminophen (NORCO) 5-325 mg per tablet Take 1 Tab by mouth every four (4) hours as needed. Max Daily Amount: 6 Tabs. No current facility-administered medications for this visit. No Known Allergies  Review of Systems:  - Cardiovascular: no chest pain  - Neurological: no tremors  - Integumentary: skin is normal    Physical Examination:  Blood pressure 128/68, pulse 66, height 5' 2\" (1.575 m), weight 124 lb 12.8 oz (56.6 kg). - General: pleasant, no distress, good eye contact   - Neck: Op site healing well, no swelling or erythema,   - Cardiovascular: regular, normal rate, nl s1 and s2, no m/r/g   - Integumentary: skin is normal, no edema  - Neurological: reflexes 2+ at biceps, no tremors, - Chevostic sign  - Psychiatric: normal mood and affect    Data Reviewed:   Specimen Source   1: Pituitary Aspiration   CYTOLOGIC INTERPRETATION:   Rare pituitary gland elements with broken down cellular debris   General Categorization   No cells diagnostic for malignancy   Specimen Adequacy   Satisfactory for evaluation.       Microscopic examination shows proliferation of uniform neuroendocrine sheet-like architecture. A reticulin stain shows loss the normal nested pattern. Immunohistochemistry demonstrates that the tumor cells express FSH and lack expression of LH, GH, TSH, ACTH and prolactin. These findings support a diagnosis of pituitary adenoma     Assessment/Plan:   1) Hyperparathyroidism > Pt underwent parathyroidectomy and has recovered well. Her Ca and PTH levels look good and they have been good since her surgery in December 2016. 2) Hypovitaminosis D > Pt to continue her Vitamin D 2000 units daily. 3) Pituitary Adenoma > The pathology showed no evidence of malignancy, but did show [address] [place] [address] only activity. This AM pt had labs drawn and based on these labs we can assess her pituitary function and wean her off HC as able. Will want to repeat her labs and MRI in 3 months to ensure no further growth or increased activity. For now pt to continue the [facility]. and LT4 75mcg daily. Pt voices understanding and agreement with the plan.     Copy sent to:  Dr. Jose Prieto

## 2017-10-18 NOTE — MR AVS SNAPSHOT
Visit Information Date & Time Provider Department Dept. Phone Encounter #  
 10/18/2017 10:10 AM Jose Cueva MD [facility] [phone] [id number] Upcoming Health Maintenance Date Due DTaP/Tdap/Td series (1 - Tdap) 7/28/1965 BREAST CANCER SCRN MAMMOGRAM 7/28/1994 FOBT Q 1 YEAR AGE 50-75 7/28/1994 ZOSTER VACCINE AGE 60> 5/28/2004 GLAUCOMA SCREENING Q2Y 7/28/2009 OSTEOPOROSIS SCREENING (DEXA) 7/28/2009 Pneumococcal 65+ Low/Medium Risk (1 of 2 - PCV13) 7/28/2009 MEDICARE YEARLY EXAM 7/28/2009 INFLUENZA AGE 9 TO ADULT 8/1/2017 Allergies as of 10/18/2017  Review Complete On: 10/6/2017 By: Kasie Price RN No Known Allergies Current Immunizations  Never Reviewed No immunizations on file. Not reviewed this visit Vitals BP Pulse Height(growth percentile) Weight(growth percentile) BMI OB Status 128/68 (BP 1 Location: Right arm, BP Patient Position: Sitting) 66 5' 2\" (1.575 m) 124 lb 12.8 oz (56.6 kg) 22.83 kg/m2 Hysterectomy Smoking Status Never Smoker Vitals History BMI and BSA Data Body Mass Index Body Surface Area  
 22.83 kg/m 2 1.57 m 2 Preferred Pharmacy Pharmacy Name Phone [facility] [address] [facility], [address] [place] [address] [address] Your Updated Medication List  
  
   
This list is accurate as of: 10/18/17 10:50 AM.  Always use your most recent med list. amLODIPine 5 mg tablet Commonly known as:  Amber Fanpearl Take 1.5 Tabs by mouth daily. cholecalciferol (vitamin D3) 2,000 unit Tab Take  by mouth. docusate sodium 50 mg capsule Commonly known as:  Altagracia Hummel Take 2 Caps by mouth two (2) times daily as needed for Constipation. HYDROcodone-acetaminophen 5-325 mg per tablet Commonly known as:  Joselin Patino Take 1 Tab by mouth every four (4) hours as needed. Max Daily Amount: 6 Tabs. hydrocortisone 10 mg tablet Commonly known as:  CORTEF Take 2 tabs PO every morning and 1 tab PO at 5PM  
  
 levothyroxine 75 mcg tablet Commonly known as:  SYNTHROID Take 1 Tab by mouth Daily (before breakfast). sodium chloride 0.65 % nasal spray Commonly known as:  OCEAN  
2 Sprays by Both Nostrils route three (3) times daily for 10 days. Introducing [facility] & [facility] SERVICES! Praveen Jarquin introduces [employer] patient portal. Now you can access parts of your medical record, email your doctor's office, and request medication refills online. 1. In your internet browser, go to [email or web address] 2. Click on the First Time User? Click Here link in the Sign In box. You will see the New Member Sign Up page. 3. Enter your [employer] Access Code exactly as it appears below. You will not need to use this code after youve completed the sign-up process. If you do not sign up before the expiration date, you must request a new code. · [employer] Access Code: [email or web address] Expires: 12/6/2017 10:53 AM 
 
4. Enter the last four digits of your Social Security Number (xxxx) and Date of Birth (mm/dd/yyyy) as indicated and click Submit. You will be taken to the next sign-up page. 5. Create a [employer] ID. This will be your [employer] login ID and cannot be changed, so think of one that is secure and easy to remember. 6. Create a [employer] password. You can change your password at any time. 7. Enter your Password Reset Question and Answer. This can be used at a later time if you forget your password. 8. Enter your e-mail address. You will receive e-mail notification when new information is available in [address] 9. Click Sign Up. You can now view and download portions of your medical record. 10. Click the Download Summary menu link to download a portable copy of your medical information.  
 
If you have questions, please visit the Frequently Asked Questions section of the [employer]. Remember, [employer] is NOT to be used for urgent needs. For medical emergencies, dial 911. Now available from your iPhone and Android! Please provide this summary of care documentation to your next provider. Your primary care clinician is listed as Niyah Novoa. If you have any questions after today's visit, please call [phone].

## 2017-10-20 ENCOUNTER — PATIENT OUTREACH (OUTPATIENT)
Dept: ENDOCRINOLOGY | Age: 72
End: 2017-10-20

## 2017-10-23 LAB
ACTH PLAS-MCNC: 20.2 PG/ML (ref 7.2–63.3)
APPEARANCE UR: CLEAR
BACTERIA #/AREA URNS HPF: ABNORMAL /[HPF]
BILIRUB UR QL STRIP: NEGATIVE
BUN SERPL-MCNC: 24 MG/DL (ref 8–27)
BUN/CREAT SERPL: 23 (ref 12–28)
CALCIUM SERPL-MCNC: 9.1 MG/DL (ref 8.7–10.3)
CASTS URNS QL MICRO: ABNORMAL /LPF
CHLORIDE SERPL-SCNC: 97 MMOL/L (ref 96–106)
CO2 SERPL-SCNC: 26 MMOL/L (ref 18–29)
COLOR UR: YELLOW
CORTIS AM PEAK SERPL-MCNC: 8.9 UG/DL (ref 6.2–19.4)
CREAT SERPL-MCNC: 1.04 MG/DL (ref 0.57–1)
EPI CELLS #/AREA URNS HPF: ABNORMAL /HPF
FSH SERPL-ACNC: 4.3 MIU/ML
GFR SERPLBLD CREATININE-BSD FMLA CKD-EPI: 53 ML/MIN/1.73
GFR SERPLBLD CREATININE-BSD FMLA CKD-EPI: 62 ML/MIN/1.73
GLUCOSE SERPL-MCNC: 87 MG/DL (ref 65–99)
GLUCOSE UR QL: NEGATIVE
HGB UR QL STRIP: NEGATIVE
IGF-I SERPL-MCNC: 64 NG/ML
INTERPRETATION: NORMAL
KETONES UR QL STRIP: NEGATIVE
LEUKOCYTE ESTERASE UR QL STRIP: ABNORMAL
LH SERPL-ACNC: 1.4 MIU/ML
MICRO URNS: ABNORMAL
MUCOUS THREADS URNS QL MICRO: PRESENT
NITRITE UR QL STRIP: NEGATIVE
OSMOLALITY SERPL: 288 MOSMOL/KG (ref 280–301)
OSMOLALITY UR: 644 MOSMOL/KG
PH UR STRIP: 6.5 [PH] (ref 5–7.5)
POTASSIUM SERPL-SCNC: 4.6 MMOL/L (ref 3.5–5.2)
PROLACTIN SERPL-MCNC: 31.1 NG/ML (ref 4.8–23.3)
PROT UR QL STRIP: NEGATIVE
RBC #/AREA URNS HPF: ABNORMAL /HPF
SODIUM SERPL-SCNC: 141 MMOL/L (ref 134–144)
SP GR UR: 1.02 (ref 1–1.03)
T4 FREE SERPL-MCNC: 1.17 NG/DL (ref 0.82–1.77)
TSH SERPL DL<=0.005 MIU/L-ACNC: 0.82 UIU/ML (ref 0.45–4.5)
UROBILINOGEN UR STRIP-MCNC: 0.2 MG/DL (ref 0.2–1)
WBC #/AREA URNS HPF: ABNORMAL /HPF

## 2017-10-24 ENCOUNTER — TELEPHONE (OUTPATIENT)
Dept: ENDOCRINOLOGY | Age: 72
End: 2017-10-24

## 2017-10-24 NOTE — TELEPHONE ENCOUNTER
Spoke with patient regarding her labs. Her AM ACTH and Cortisol looked good. This tells me she does NOT need to continue the Hydrocortisone going forward. Pt to stop the PM dose of 5mg for a week, then cut her AM dose in half for a week and then stop the Hydrocortisone all together. Her thyroid levels are looking good. Pt to continue her Levothyroxine 75mcg daily. Her Uosm and Posm look good and her UA shows a good Specific Gravity. She is concentrating her Urine appropriately and her Serum Sodium was was normal at 141.

## 2017-10-24 NOTE — TELEPHONE ENCOUNTER
Patient stated that she is returning your call regarding her lab results and can be reached at [phone].

## 2017-10-25 ENCOUNTER — TELEPHONE (OUTPATIENT)
Dept: ENDOCRINOLOGY | Age: 72
End: 2017-10-25

## 2017-10-25 NOTE — TELEPHONE ENCOUNTER
Patient wanted confirmation of how to take the hydrocortisone. States  called her with her results, but couldn't remember how to adjust her dose. Read her the results from her chart:  Her AM ACTH and Cortisol looked good. This tells me she does NOT need to continue the Hydrocortisone going forward.  Pt to stop the PM dose of 5mg for a week, then cut her AM dose in half for a week and then stop the Hydrocortisone all together

## 2017-10-25 NOTE — TELEPHONE ENCOUNTER
Patient is requesting a call back. She stated that it is regarding her medications. She can be reached at [phone].

## 2017-11-06 ENCOUNTER — TELEPHONE (OUTPATIENT)
Dept: ENDOCRINOLOGY | Age: 72
End: 2017-11-06

## 2017-11-06 NOTE — TELEPHONE ENCOUNTER
Addendum: 11/6/2017, 1:56 PM  Spoke with Mary Lou Sierra by phone. She wanted to confirm how to take her hydrocortisone. Advised per lab result from 10/18/17, lastly cut the AM dose in half x 1 week, then stop. She had already been weaning dose. Patient states will take her last dose tomorrow. She states that she has \"great energy\". She states that she is almost out of Amlodipine. Advised get PCP to refill it, as PCP's office refilled it in October. Patient expressed understanding.       Deirdre Manning LPN

## 2017-12-05 ENCOUNTER — TELEPHONE (OUTPATIENT)
Dept: ENDOCRINOLOGY | Age: 72
End: 2017-12-05

## 2017-12-05 NOTE — TELEPHONE ENCOUNTER
Patient called back stating that she had dropped off the Rx for Synthroid at [facility] in [address], but thinks it may have been sent to the original prescriber. She does not know the status. Advised if the original prescriber declines the Rx, call us and  can refill it. She states that she is taking 75 mcg (she was taking 50 mcg 1 and a half tabs daily=75 mcg).

## 2017-12-06 RX ORDER — LEVOTHYROXINE SODIUM 75 UG/1
75 TABLET ORAL
Qty: 30 TAB | Refills: 5 | Status: SHIPPED | OUTPATIENT
Start: 2017-12-06 | End: 2018-05-07 | Stop reason: SDUPTHER

## 2017-12-06 NOTE — TELEPHONE ENCOUNTER
Patient called to get a prescription called in at [facility] in [address] [facility] [phone], the name is Levothyroxine 75 mg. She said she just had it filled at [facility]. Please advise. Thanks. Shi Simon.

## 2018-01-10 ENCOUNTER — HOSPITAL ENCOUNTER (OUTPATIENT)
Dept: MRI IMAGING | Age: 73
Discharge: HOME | End: 2018-01-10
Attending: NEUROLOGICAL SURGERY
Payer: MEDICARE

## 2018-01-10 DIAGNOSIS — D49.7 PITUITARY TUMOR: ICD-10-CM

## 2018-01-10 PROCEDURE — 70553 MRI BRAIN STEM W/O & W/DYE: CPT

## 2018-01-10 PROCEDURE — 74011250636 HC RX REV CODE- 250/636

## 2018-01-10 PROCEDURE — A9576 INJ PROHANCE MULTIPACK: HCPCS

## 2018-02-01 ENCOUNTER — OFFICE VISIT (OUTPATIENT)
Dept: ENDOCRINOLOGY | Age: 73
End: 2018-02-01

## 2018-02-01 VITALS
DIASTOLIC BLOOD PRESSURE: 74 MMHG | WEIGHT: 137.2 LBS | SYSTOLIC BLOOD PRESSURE: 167 MMHG | BODY MASS INDEX: 25.25 KG/M2 | HEIGHT: 62 IN | HEART RATE: 62 BPM

## 2018-02-01 DIAGNOSIS — E21.0 PRIMARY HYPERPARATHYROIDISM (HCC): Primary | ICD-10-CM

## 2018-02-01 DIAGNOSIS — D35.2 PITUITARY ADENOMA (HCC): ICD-10-CM

## 2018-02-01 DIAGNOSIS — E89.0 POSTOPERATIVE HYPOTHYROIDISM: ICD-10-CM

## 2018-02-01 DIAGNOSIS — E55.9 HYPOVITAMINOSIS D: ICD-10-CM

## 2018-02-01 NOTE — PROGRESS NOTES
Chief Complaint   Patient presents with    Pituitary Problem     pcp and pharmacy verified    Thyroid Problem   Record since last visit reviewed  History of Present Illness: Giovanna Sierra is a 67 y.o. female here for follow up of primary hyperparathyroidism and pituitary adenoma s/p resection. On 10/4/17 she presented to the ED with HA and imaging studies showed a large pituitary mass. She was taken to the OR by Luis Anegl Hua and Dr. Gertrudis Damon and had resection of the mass. The pathology was read as benign and showed [address] activity only. She tolerated the post-op hospitalization and was discharged home on LT4 75mcg daily and HC 10mg in the AM and 5mg with dinner. Pt has since been weaned off the Hydrocortisone since November 2017. Pt notes that she has gone from 125 to 135 pounds since her pituitary resection. She denies changing her diet and had been remaining physically active. She denies issues of lightheadedness, dizziness, HAs, palpitations, CP, SOB, syncope, or pre-syncope. She denies issues of perioral numbness or numbness in her hands and fingers. She is still taking the LT4 75mcg and Vitamin D 2000 units daily. Pt notes she is keeping up with moderate activity, though she will take a nap in the mid-afternoon. She dose note that at night she will sometimes be restless an does not sleep through the night. For her hyperparathyroidism a our initial visit our work up included imaging which showed an oval solid nodule contiguous with the lower pole of the left   thyroid lobe compatible with enlarged parathyroid, and correlating with   today's nuclear imaging. It measures approximately 1.6 x 0.7 x 0.6 cm. The initial images demonstrate an asymmetric focus of increased radiotracer   localization adjacent to the lower pole of the left thyroid lobe. The delayed images demonstrate persistent radiotracer focal retention, left   Lower.     Pt was referred to Dr. Sanket Slade, who took her to the OR on 12/2/16 and found two enlarged parathyroid glands (Inferior Left and Superior Right). These were resected and sent to pathology. They were both read as benign parathyroid adenoma. Her pre-op PTH was 242 and her post-op PTH was 7.3. Her Ca and PTH levels look good and they have been good since her surgery in December 2016. Current Outpatient Prescriptions   Medication Sig    levothyroxine (SYNTHROID) 75 mcg tablet Take 1 Tab by mouth Daily (before breakfast).  amLODIPine (NORVASC) 5 mg tablet Take 1.5 Tabs by mouth daily.  docusate sodium (COLACE) 50 mg capsule Take 2 Caps by mouth two (2) times daily as needed for Constipation. (Patient taking differently: Take 100 mg by mouth daily.)    cholecalciferol, vitamin D3, 2,000 unit tab Take  by mouth. No current facility-administered medications for this visit. No Known Allergies  Review of Systems:  - Cardiovascular: no chest pain  - Neurological: no tremors  - Integumentary: skin is normal    Physical Examination:  Blood pressure 167/74, pulse 62, height 5' 2\" (1.575 m), weight 137 lb 3.2 oz (62.2 kg). - General: pleasant, no distress, good eye contact   - Neck: Op site healing well, no swelling or erythema,   - Cardiovascular: regular, normal rate, nl s1 and s2, no m/r/g   - Integumentary: skin is normal, no edema  - Neurological: reflexes 2+ at biceps, no tremors, - Chevostic sign  - Psychiatric: normal mood and affect    Data Reviewed:   EXAM:  MRI BRAIN W WO CONT  INDICATION:  Pituitary tumor  TECHNIQUE: Thin 2 mm sagittal and coronal T1 weighted images centered on the  sella were obtained followed by intravenous infusion 11 mL intravenous ProHance  repeat thin sagittal and coronal T1-weighted images. Axial FLAIR and T2-weighted  images as well as axial diffusion weighted images and whole head post gadolinium  axial T1 images of the head were also obtained.   COMPARISON: CT head 10/7/17 and 10/5/17 and MRI 10/3/17  FINDINGS:  Postoperative findings from patient's transsphenoidal pituitary tumor resection  are again demonstrated as initially noted on immediate postop CT 10/7/17. There  is high density material within the residual pituitary tissue likely related to  fat packing and/or blood products. There has been significant improvement with  the pituitary having a flat margin extending from the tuberculum sella to the  dorsum sella and there is no longer extension up into the suprasellar cistern  displacing or compressing the optic chiasm. There is residual soft tissue in the  generally enlarged sella. The central area of what appears represent fat packing measures approximately 9  x 14 x 10 mm and the overall pituitary measures 2.1 x 1.4 x 2.1 cm. No unusual or detrimental postoperative findings demonstrated. The jugular size and configuration are normal.  Again noted are small foci of T2 hyperintensity in the cerebral white matter  without restricted diffusion or abnormal enhancement suggesting mild chronic  small vessel ischemic change. No other abnormal intracranial enhancement, mass,  hemorrhage or abnormal extra-axial fluid collections.     IMPRESSION  IMPRESSION:   1. Postoperative findings from transsphenoidal pituitary tumor resection as  described above. 2. No acute intracranial abnormality. Assessment/Plan:   1) Hyperparathyroidism > Pt underwent parathyroidectomy and has recovered well. Her Ca and PTH levels look good and they have been good since her surgery in December 2016. 2) Hypovitaminosis D > Pt to continue her Vitamin D 2000 units daily. Will check a Vitamin D level today. 3) Pituitary Adenoma > The pathology showed no evidence of malignancy, but did show [address] only activity. Will check her TFTs today and adjust her LT4 as needed. Will For now pt to continue the [facility] [place]. and LT4 75mcg daily. She has noted about 10 pounds of weight gain since surgery.  I will start with checking her TFTs and if they are normal we can test two midnight salivary cortisol levels to rule out hypercortisolemia. RTC 6 months    Pt voices understanding and agreement with the plan.     Copy sent to:  Dr. J Luis Mar

## 2018-02-01 NOTE — PATIENT INSTRUCTIONS
I will call you with the results of today's labs and instructions about the saliva tests we discussed.

## 2018-02-01 NOTE — MR AVS SNAPSHOT
[address] [place] [address] [address] [id number] [phone] Patient: Leonel Peña MRN: [id number] :1945 Visit Information Date & Time Provider Department Dept. Phone Encounter #  
 2018  8:30 AM Olimpia Floyd MD [facility] [phone] [id number] Follow-up Instructions Return in about 6 months (around 2018). Upcoming Health Maintenance Date Due DTaP/Tdap/Td series (1 - Tdap) 1965 BREAST CANCER SCRN MAMMOGRAM 1994 FOBT Q 1 YEAR AGE 50-75 1994 ZOSTER VACCINE AGE 60> 2004 GLAUCOMA SCREENING Q2Y 2009 OSTEOPOROSIS SCREENING (DEXA) 2009 Pneumococcal 65+ Low/Medium Risk (1 of 2 - PCV13) 2009 MEDICARE YEARLY EXAM 2009 Influenza Age 5 to Adult 2017 Allergies as of 2018  Review Complete On: 2018 By: Olimpia Floyd MD  
 No Known Allergies Current Immunizations  Never Reviewed No immunizations on file. Not reviewed this visit You Were Diagnosed With   
  
 Codes Comments Primary hyperparathyroidism ([facility] [address]    -  Primary ICD-10-CM: E21.0 ICD-9-CM: 252.01 Pituitary adenoma ([facility] [address]     ICD-10-CM: D35.2 ICD-9-CM: 227.3 Hypovitaminosis D     ICD-10-CM: E55.9 ICD-9-CM: 268.9 Postoperative hypothyroidism     ICD-10-CM: E89.0 ICD-9-CM: 244.0 Vitals BP Pulse Height(growth percentile) Weight(growth percentile) BMI OB Status 167/74 62 5' 2\" (1.575 m) 137 lb 3.2 oz (62.2 kg) 25.09 kg/m2 Hysterectomy Smoking Status Never Smoker Vitals History BMI and BSA Data Body Mass Index Body Surface Area 25.09 kg/m 2 1.65 m 2 Preferred Pharmacy Pharmacy Name Phone [facility] [phone] [place] [address] Your Updated Medication List  
  
   
 This list is accurate as of: 2/1/18  8:51 AM.  Always use your most recent med list. amLODIPine 5 mg tablet Commonly known as:  Tylene Buddy Take 1.5 Tabs by mouth daily. cholecalciferol (vitamin D3) 2,000 unit Tab Take  by mouth. docusate sodium 50 mg capsule Commonly known as:  Jessica Rodriguez Take 2 Caps by mouth two (2) times daily as needed for Constipation. levothyroxine 75 mcg tablet Commonly known as:  SYNTHROID Take 1 Tab by mouth Daily (before breakfast). We Performed the Following CORTISOL (2 SPECIMENS) [id number] CPT(R)] RENAL FUNCTION PANEL [18970 CPT(R)] T4, FREE [id number] CPT(R)] TSH 3RD GENERATION [76023 CPT(R)] VITAMIN D, 25 HYDROXY [id number] CPT(R)] Follow-up Instructions Return in about 6 months (around 8/1/2018). Patient Instructions I will call you with the results of today's labs and instructions about the saliva tests we discussed. Introducing [facility]! [facility] introduces [employer] patient portal. Now you can access parts of your medical record, email your doctor's office, and request medication refills online. 1. In your internet browser, go to [email or web address] 2. Click on the First Time User? Click Here link in the Sign In box. You will see the New Member Sign Up page. 3. Enter your [employer] Access Code exactly as it appears below. You will not need to use this code after youve completed the sign-up process. If you do not sign up before the expiration date, you must request a new code. · [employer] Access Code: [email or web address] Expires: 4/9/2018  2:57 PM 
 
4. Enter the last four digits of your Social Security Number (xxxx) and Date of Birth (mm/dd/yyyy) as indicated and click Submit. You will be taken to the next sign-up page. 5. Create a [employer] ID. This will be your [employer] login ID and cannot be changed, so think of one that is secure and easy to remember. 6. Create a Fon password. You can change your password at any time. 7. Enter your Password Reset Question and Answer. This can be used at a later time if you forget your password. 8. Enter your e-mail address. You will receive e-mail notification when new information is available in [address] 9. Click Sign Up. You can now view and download portions of your medical record. 10. Click the Download Summary menu link to download a portable copy of your medical information. If you have questions, please visit the Frequently Asked Questions section of the [employer] website. Remember, [employer] is NOT to be used for urgent needs. For medical emergencies, dial 911. Now available from your iPhone and Android! Please provide this summary of care documentation to your next provider. Your primary care clinician is listed as Usha Langley. If you have any questions after today's visit, please call [phone].

## 2018-02-02 LAB
25(OH)D3+25(OH)D2 SERPL-MCNC: 34.4 NG/ML (ref 30–100)
ALBUMIN SERPL-MCNC: 4.6 G/DL (ref 3.5–4.8)
BUN SERPL-MCNC: 23 MG/DL (ref 8–27)
BUN/CREAT SERPL: 24 (ref 12–28)
CALCIUM SERPL-MCNC: 9.3 MG/DL (ref 8.7–10.3)
CHLORIDE SERPL-SCNC: 100 MMOL/L (ref 96–106)
CO2 SERPL-SCNC: 23 MMOL/L (ref 18–29)
CREAT SERPL-MCNC: 0.94 MG/DL (ref 0.57–1)
GFR SERPLBLD CREATININE-BSD FMLA CKD-EPI: 60 ML/MIN/1.73
GFR SERPLBLD CREATININE-BSD FMLA CKD-EPI: 70 ML/MIN/1.73
GLUCOSE SERPL-MCNC: 105 MG/DL (ref 65–99)
PHOSPHATE SERPL-MCNC: 4 MG/DL (ref 2.5–4.5)
POTASSIUM SERPL-SCNC: 4.4 MMOL/L (ref 3.5–5.2)
SODIUM SERPL-SCNC: 142 MMOL/L (ref 134–144)
T4 FREE SERPL-MCNC: 1.37 NG/DL (ref 0.82–1.77)
TSH SERPL DL<=0.005 MIU/L-ACNC: 0.05 UIU/ML (ref 0.45–4.5)

## 2018-02-02 NOTE — PROGRESS NOTES
Her Free T4 is at goal on the Levothyroxine 75mcg daily. Her TSH is low, but since she had pituitary surgery it would fit that her TSH should be low. Pt to continue the LT4 75mcg daily and was instructed to collect two consecutive midnight salivary samples to test cortisol levels.

## 2018-02-08 LAB
CORTIS BS SERPL-MCNC: NORMAL UG/DL
SPECIMEN STATUS REPORT, ROLRST: NORMAL
SPECIMEN STATUS REPORT, ROLRST: NORMAL

## 2018-02-10 LAB
CORTIS BS SAL-MCNC: 0.05 UG/DL
CORTIS SP1 P CHAL SAL-MCNC: 0.02 UG/DL
SPECIMEN STATUS REPORT, ROLRST: NORMAL

## 2018-05-02 ENCOUNTER — OFFICE VISIT (OUTPATIENT)
Dept: ENDOCRINOLOGY | Age: 73
End: 2018-05-02

## 2018-05-02 VITALS
BODY MASS INDEX: 25.1 KG/M2 | HEART RATE: 63 BPM | HEIGHT: 62 IN | WEIGHT: 136.4 LBS | DIASTOLIC BLOOD PRESSURE: 86 MMHG | SYSTOLIC BLOOD PRESSURE: 178 MMHG

## 2018-05-02 DIAGNOSIS — E21.0 PRIMARY HYPERPARATHYROIDISM (HCC): ICD-10-CM

## 2018-05-02 DIAGNOSIS — E55.9 HYPOVITAMINOSIS D: ICD-10-CM

## 2018-05-02 DIAGNOSIS — D35.2 PITUITARY ADENOMA (HCC): Primary | ICD-10-CM

## 2018-05-02 RX ORDER — CHOLECALCIFEROL (VITAMIN D3) 125 MCG
CAPSULE ORAL
Qty: 90 TAB | Refills: 3 | Status: SHIPPED | OUTPATIENT
Start: 2018-05-02 | End: 2019-11-05 | Stop reason: SDUPTHER

## 2018-05-02 NOTE — MR AVS SNAPSHOT
[address] [facility] [address] [address] [phone] Patient: Earnestine Trevino MRN: [id number] :1945 Visit Information Date & Time Provider Department Dept. Phone Encounter #  
 2018  9:30 AM Ricki Bedoya MD [facility] [id number] Follow-up Instructions Return in about 6 months (around 2018). Upcoming Health Maintenance Date Due DTaP/Tdap/Td series (1 - Tdap) 1965 BREAST CANCER SCRN MAMMOGRAM 1994 FOBT Q 1 YEAR AGE 50-75 1994 ZOSTER VACCINE AGE 60> 2004 GLAUCOMA SCREENING Q2Y 2009 Bone Densitometry (Dexa) Screening 2009 Pneumococcal 65+ Low/Medium Risk (1 of 2 - PCV13) 2009 MEDICARE YEARLY EXAM 3/14/2018 Influenza Age 5 to Adult 2018 Allergies as of 2018  Review Complete On: 2018 By: Ricki Bedoya MD  
 No Known Allergies Current Immunizations  Never Reviewed No immunizations on file. Not reviewed this visit You Were Diagnosed With   
  
 Codes Comments Pituitary adenoma ([facility] [address]    -  Primary ICD-10-CM: D35.2 ICD-9-CM: 227.3 Primary hyperparathyroidism ([facility] [address]     ICD-10-CM: E21.0 ICD-9-CM: 252.01 Hypovitaminosis D     ICD-10-CM: E55.9 ICD-9-CM: 268.9 Vitals BP Pulse Height(growth percentile) Weight(growth percentile) BMI OB Status 178/86 (BP 1 Location: Right arm, BP Patient Position: Sitting) 63 5' 2\" (1.575 m) 136 lb 6.4 oz (61.9 kg) 24.95 kg/m2 Hysterectomy Smoking Status Never Smoker Vitals History BMI and BSA Data Body Mass Index Body Surface Area 24.95 kg/m 2 1.65 m 2 Preferred Pharmacy Pharmacy Name Phone [facility] [address] [facility], [address] [place] [address] Chester Almanzar [address] Your Updated Medication List  
  
   
 This list is accurate as of 5/2/18  9:40 AM.  Always use your most recent med list. amLODIPine 5 mg tablet Commonly known as:  Skip Newcomer Take 1.5 Tabs by mouth daily. cholecalciferol (vitamin D3) 2,000 unit Tab One tablet daily  
  
 docusate sodium 50 mg capsule Commonly known as:  Idella Razor Take 2 Caps by mouth two (2) times daily as needed for Constipation. levothyroxine 75 mcg tablet Commonly known as:  SYNTHROID Take 1 Tab by mouth Daily (before breakfast). Prescriptions Sent to Pharmacy Refills  
 cholecalciferol, vitamin D3, 2,000 unit tab 3 Sig: One tablet daily Class: Normal  
 Pharmacy: [id number] Bryan Byrd  #: [phone] We Performed the Following [address] AND  [[address] CPT(R)] IGF-1 [id number] CPT(R)] METABOLIC PANEL, COMPREHENSIVE [07073 CPT(R)] OSMOLALITY, SERUM/PLASMA [id number] CPT(R)] OSMOLALITY, UR [id number] CPT(R)] T4, FREE [id number] CPT(R)] URINALYSIS W/ RFLX MICROSCOPIC [12779 CPT(R)] VITAMIN D, 25 HYDROXY [id number] CPT(R)] Follow-up Instructions Return in about 6 months (around 11/2/2018). Introducing \[facility]\"" & HEALTH SERVICES! Soren Menard introduces [employer] patient portal. Now you can access parts of your medical record, email your doctor's office, and request medication refills online. 1. In your internet browser, go to [email or web address] 2. Click on the First Time User? Click Here link in the Sign In box. You will see the New Member Sign Up page. 3. Enter your [employer] Access Code exactly as it appears below. You will not need to use this code after youve completed the sign-up process. If you do not sign up before the expiration date, you must request a new code. · [employer] Access Code: [email or web address] Expires: 7/31/2018  9:39 AM 
 
4.  Enter the last four digits of your Social Security Number (xxxx) and Date of Birth (mm/dd/yyyy) as indicated and click Submit. You will be taken to the next sign-up page. 5. Create a TextCorner ID. This will be your TextCorner login ID and cannot be changed, so think of one that is secure and easy to remember. 6. Create a TextCorner password. You can change your password at any time. 7. Enter your Password Reset Question and Answer. This can be used at a later time if you forget your password. 8. Enter your e-mail address. You will receive e-mail notification when new information is available in [address] 9. Click Sign Up. You can now view and download portions of your medical record. 10. Click the Download Summary menu link to download a portable copy of your medical information. If you have questions, please visit the Frequently Asked Questions section of the TextCorner website. Remember, Text[employer]ner is NOT to be used for urgent needs. For medical emergencies, dial 911. Now available from your iPhone and Android! Please provide this summary of care documentation to your next provider. Your primary care clinician is listed as Fabricio Bailon. If you have any questions after today's visit, please call [phone].

## 2018-05-02 NOTE — PROGRESS NOTES
Chief Complaint   Patient presents with    Thyroid Problem     pcp and pharmacy verified    Tongue Swelling     ? from thyroid problem   Record since last visit reviewed  History of Present Illness: Giovanna Sierra is a 67 y.o. female here for follow up of primary hyperparathyroidism and pituitary adenoma s/p resection. On 10/4/17 she presented to the ED with HA and imaging studies showed a large pituitary mass. She was taken to the OR by Linda Scruggs and Dr. Joe Wild and had resection of the mass. The pathology was read as benign and showed [address] [facility] [address] activity only. She tolerated the post-op hospitalization and was discharged home on LT4 75mcg daily and HC 10mg in the AM and 5mg with dinner. She was weaned off the Hydrocortisone and has not been on any steroids since November 2017. Her repeat MRI in January 2018 showed the post-op changes, but did not show any concerning features. Pt continues to follow with Dr. Keiry Brown of [address] [place] [address]. She will see him in 6 months for follow up MRI and repeat evaluation. At our last visit in February 2018 she had reported weight gain since her surgery and we tested her for evidence of hypercortisolemia. Two consecutive Midnight salivary cortisol tests were not elevated. She was clinically and biochemically euthyroid on LT4 75mcg daily. Pt notes that she has had as cough that has not resolved since January 2018, she also reports that for the last two weeks she noted three days of tongue swelling, which resolved. The swelling is not causing trouble with breathing or swallowing. She does note that \"I can see that it looks larger and I can see the imprint of my teeth on the side so my tongue\". She denies issues of lightheadedness, dizziness, HAs, palpitations, CP, SOB, syncope, or pre-syncope. She denies issues of perioral numbness or numbness in her hands and fingers. She does reports that \"there are times when I get impatient and irritable\".   Her weight is stable since February 2018. She is still taking the LT4 75mcg and Vitamin D 2000 units daily. Pt notes she is keeping up with moderate activity, though she will take a nap in the mid-afternoon. She dose note that at night she will sometimes be restless an does not sleep through the night. For her hyperparathyroidism a our initial visit our work up included imaging which showed an oval solid nodule contiguous with the lower pole of the left   thyroid lobe compatible with enlarged parathyroid, and correlating with   today's nuclear imaging. It measures approximately 1.6 x 0.7 x 0.6 cm. The initial images demonstrate an asymmetric focus of increased radiotracer   localization adjacent to the lower pole of the left thyroid lobe. The delayed images demonstrate persistent radiotracer focal retention, left   Lower. Pt was referred to Dr. Misha Isabel, who took her to the OR on 12/2/16 and found two enlarged parathyroid glands (Inferior Left and Superior Right). These were resected and sent to pathology. They were both read as benign parathyroid adenoma. Her pre-op PTH was 242 and her post-op PTH was 7.3. Her Ca and PTH levels look good and they have been good since her surgery in December 2016. Current Outpatient Prescriptions   Medication Sig    levothyroxine (SYNTHROID) 75 mcg tablet Take 1 Tab by mouth Daily (before breakfast).  amLODIPine (NORVASC) 5 mg tablet Take 1.5 Tabs by mouth daily.  docusate sodium (COLACE) 50 mg capsule Take 2 Caps by mouth two (2) times daily as needed for Constipation. (Patient taking differently: Take 100 mg by mouth daily.)    cholecalciferol, vitamin D3, 2,000 unit tab Take  by mouth. No current facility-administered medications for this visit.       No Known Allergies  Review of Systems:  - Cardiovascular: no chest pain  - Neurological: no tremors  - Integumentary: skin is normal    Physical Examination:  Blood pressure 178/86, pulse 63, height 5' 2\" (1.575 m), weight 136 lb 6.4 oz (61.9 kg). - General: pleasant, no distress, good eye contact   - Neck: Op site healing well, no swelling or erythema,   - Cardiovascular: regular, normal rate, nl s1 and s2, no m/r/g   - Integumentary: skin is normal, no edema  - Neurological: reflexes 2+ at biceps, no tremors, - Chevostic sign  - Psychiatric: normal mood and affect    Data Reviewed:       Assessment/Plan:   1) Hyperparathyroidism > Pt underwent parathyroidectomy and has recovered well. Her Ca and PTH levels look good and they have been good since her surgery in December 2016. Will check a renal panel today. 2) Hypovitaminosis D > Pt to continue her Vitamin D 2000 units daily. Will check a Vitamin D level today. 3) Pituitary Adenoma > The pathology showed no evidence of malignancy, but did show [facility] only activity. Will check a Free T4 today and adjust her LT4 as needed. Will check an [facility] level today. Because of the report of tongue swelling, will check an IGF-1 level. She will need 90 day refills of her LT4 when the levels come back. RTC 6 months    Pt voices understanding and agreement with the plan.     Copy sent to:  Dr. Jazmine Rodriguez

## 2018-05-07 LAB
25(OH)D3+25(OH)D2 SERPL-MCNC: 39 NG/ML (ref 30–100)
ALBUMIN SERPL-MCNC: 4.8 G/DL (ref 3.5–4.8)
ALBUMIN/GLOB SERPL: 1.7 {RATIO} (ref 1.2–2.2)
ALP SERPL-CCNC: 98 IU/L (ref 39–117)
ALT SERPL-CCNC: 16 IU/L (ref 0–32)
APPEARANCE UR: CLEAR
AST SERPL-CCNC: 19 IU/L (ref 0–40)
BACTERIA #/AREA URNS HPF: ABNORMAL /[HPF]
BILIRUB SERPL-MCNC: 0.3 MG/DL (ref 0–1.2)
BILIRUB UR QL STRIP: NEGATIVE
BUN SERPL-MCNC: 27 MG/DL (ref 8–27)
BUN/CREAT SERPL: 26 (ref 12–28)
CALCIUM SERPL-MCNC: 9.8 MG/DL (ref 8.7–10.3)
CASTS URNS QL MICRO: ABNORMAL /LPF
CHLORIDE SERPL-SCNC: 101 MMOL/L (ref 96–106)
CO2 SERPL-SCNC: 25 MMOL/L (ref 18–29)
COLOR UR: YELLOW
CREAT SERPL-MCNC: 1.02 MG/DL (ref 0.57–1)
EPI CELLS #/AREA URNS HPF: ABNORMAL /HPF
FSH SERPL-ACNC: 9.6 MIU/ML
GFR SERPLBLD CREATININE-BSD FMLA CKD-EPI: 55 ML/MIN/1.73
GFR SERPLBLD CREATININE-BSD FMLA CKD-EPI: 63 ML/MIN/1.73
GLOBULIN SER CALC-MCNC: 2.8 G/DL (ref 1.5–4.5)
GLUCOSE SERPL-MCNC: 109 MG/DL (ref 65–99)
GLUCOSE UR QL: NEGATIVE
HGB UR QL STRIP: NEGATIVE
IGF-I SERPL-MCNC: 77 NG/ML
INTERPRETATION: NORMAL
KETONES UR QL STRIP: NEGATIVE
LEUKOCYTE ESTERASE UR QL STRIP: ABNORMAL
LH SERPL-ACNC: 4.7 MIU/ML
MICRO URNS: ABNORMAL
MUCOUS THREADS URNS QL MICRO: PRESENT
NITRITE UR QL STRIP: NEGATIVE
OSMOLALITY SERPL: 295 MOSMOL/KG (ref 280–301)
OSMOLALITY UR: 799 MOSMOL/KG
PH UR STRIP: 6 [PH] (ref 5–7.5)
POTASSIUM SERPL-SCNC: 4.6 MMOL/L (ref 3.5–5.2)
PROT SERPL-MCNC: 7.6 G/DL (ref 6–8.5)
PROT UR QL STRIP: NEGATIVE
RBC #/AREA URNS HPF: ABNORMAL /HPF
SODIUM SERPL-SCNC: 140 MMOL/L (ref 134–144)
SP GR UR: 1.02 (ref 1–1.03)
T4 FREE SERPL-MCNC: 1.42 NG/DL (ref 0.82–1.77)
UROBILINOGEN UR STRIP-MCNC: 0.2 MG/DL (ref 0.2–1)
WBC #/AREA URNS HPF: ABNORMAL /HPF

## 2018-05-07 RX ORDER — LEVOTHYROXINE SODIUM 75 UG/1
75 TABLET ORAL
Qty: 90 TAB | Refills: 3 | Status: SHIPPED | OUTPATIENT
Start: 2018-05-07 | End: 2019-05-23 | Stop reason: SDUPTHER

## 2018-05-07 NOTE — PROGRESS NOTES
Spoke with pt regarding her labs. Her FT4, IGF-1, FSH, LH, Urine and Serum OSM and CMP looked normal.  Her UA shows some evidence of possible UTI, but pt is asymptomatic so no plan to start Abx.

## 2018-05-21 ENCOUNTER — TELEPHONE (OUTPATIENT)
Dept: ENDOCRINOLOGY | Age: 73
End: 2018-05-21

## 2018-05-21 NOTE — TELEPHONE ENCOUNTER
The fact that when she stopped the Amlodipine she felt better is strong evidence to stay off of it going forward. I will add Amlodipine to her allergy list.  I will forward her message to Dr. Edson Patel as well.

## 2018-05-21 NOTE — TELEPHONE ENCOUNTER
Patient states that she had been complaining of a cough and gagging with phlegm while taking Amlodipine. She was told that it was doubtful that Amlodipine was causing her symptoms. She decided to see if her sxs got better off Amlodipine x 2 weeks. On 5/6/17 her BP was 157/82 (still on Amlodipine). She stopped taking Amlodipine on 5/7/18. Cough went away. Felt better. Her mood swings and hot flashes got better  Her BP was 158/82 upon awakening. After a walk 134/78. Took another walk 166/76 and then again 149/70.  5/8/18: 141/78 upon awakening. 137/77 after a walk. 5/9/18:161/80 upon awakening. 140/79 after a walk. After a salty lunch: 165/80 and 136/83 after a walk. 5/101/18: 152/68 upon awakening. 144/74 after a walk. 147/86 at 7 pm. At 3 celery stix for a snack. 5/11/18 126/74 upon awakening  5/12/18 145/76 upon awaking. 142/76 at 2 pm.  5/13/18 Back on Amlodipine. Her cough with phlegm returned. She didn't feel as well when was not taking. Her BPs average: 124//76.

## 2018-05-21 NOTE — TELEPHONE ENCOUNTER
Patient is requesting a call back in regards to some questions that she has. Patient did not go into anymore detail. She can be reached at [phone].

## 2018-11-02 ENCOUNTER — OFFICE VISIT (OUTPATIENT)
Dept: ENDOCRINOLOGY | Age: 73
End: 2018-11-02

## 2018-11-02 VITALS
WEIGHT: 135 LBS | HEART RATE: 61 BPM | SYSTOLIC BLOOD PRESSURE: 150 MMHG | DIASTOLIC BLOOD PRESSURE: 79 MMHG | HEIGHT: 62 IN | BODY MASS INDEX: 24.84 KG/M2

## 2018-11-02 DIAGNOSIS — E55.9 HYPOVITAMINOSIS D: ICD-10-CM

## 2018-11-02 DIAGNOSIS — D35.2 PITUITARY ADENOMA (HCC): Primary | ICD-10-CM

## 2018-11-02 DIAGNOSIS — E21.0 PRIMARY HYPERPARATHYROIDISM (HCC): ICD-10-CM

## 2018-11-02 DIAGNOSIS — E89.0 POSTOPERATIVE HYPOTHYROIDISM: ICD-10-CM

## 2018-11-02 NOTE — PROGRESS NOTES
Chief Complaint   Patient presents with    Thyroid Problem     pcp and pharmacy verified   Record since last visit reviewed  History of Present Illness: Giovanna Sierra is a 75 y.o. female here for follow up of primary hyperparathyroidism and pituitary adenoma s/p resection with resultant central hypothyroidism. On 10/4/17 she presented to the ED with HA and imaging studies showed a large pituitary mass. She was taken to the OR by Sandy Dwyer and Dr. Jaymie Reyes and had resection of the mass. The pathology was read as benign and showed [address] [facility] [address] activity only. She tolerated the post-op hospitalization and was discharged home on LT4 75mcg daily and HC 10mg in the AM and 5mg with dinner. She was weaned off the Hydrocortisone and has not been on any steroids since November 2017. Her repeat MRI in January 2018 showed the post-op changes, but did not show any concerning features. Pt continues to follow with Dr. Rossi Cain of [address] [place] [address]. She will see him in January 2019 for follow up MRI and repeat evaluation. At our visit in February 2018 she had reported weight gain since her surgery and we tested her for evidence of hypercortisolemia. Two consecutive Midnight salivary cortisol tests were not elevated. She was clinically and biochemically euthyroid on LT4 75mcg daily. She denies issues of lightheadedness, dizziness, HAs, palpitations, CP, SOB, syncope, or pre-syncope. She denies issues of perioral numbness or numbness in her hands and fingers. She denies issues of polyuria or polydipsia. Her weight is stable since February 2018. She is still taking the LT4 75mcg and Vitamin D 2000 units daily. Pt notes that since she stopped the Amlodipine she has not had the tongue swelling, SOB or MEDINA. Pt notes she is keeping up with moderate activity, though she will take a nap in the mid-afternoon. She dose note that at night she will sometimes be restless an does not sleep through the night.      For her hyperparathyroidism our initial visit our work up included imaging which showed an oval solid nodule contiguous with the lower pole of the left   thyroid lobe compatible with enlarged parathyroid, and correlating with   today's nuclear imaging. It measures approximately 1.6 x 0.7 x 0.6 cm. The initial images demonstrate an asymmetric focus of increased radiotracer   localization adjacent to the lower pole of the left thyroid lobe. The delayed images demonstrate persistent radiotracer focal retention, left   Lower. Pt was referred to Dr. Katlyn Mcelroy, who took her to the OR on 12/2/16 and found two enlarged parathyroid glands (Inferior Left and Superior Right). These were resected and sent to pathology. They were both read as benign parathyroid adenoma. Her pre-op PTH was 242 and her post-op PTH was 7.3. Her Ca and PTH levels look good and they have been good since her surgery in December 2016. Current Outpatient Medications   Medication Sig    levothyroxine (SYNTHROID) 75 mcg tablet Take 1 Tab by mouth Daily (before breakfast).  cholecalciferol, vitamin D3, 2,000 unit tab One tablet daily    docusate sodium (COLACE) 50 mg capsule Take 2 Caps by mouth two (2) times daily as needed for Constipation. (Patient taking differently: Take 100 mg by mouth daily.)     No current facility-administered medications for this visit. Allergies   Allergen Reactions    Amlodipine Cough     Review of Systems:  - Cardiovascular: no chest pain  - Neurological: no tremors  - Integumentary: skin is normal    Physical Examination:  Blood pressure 150/79, pulse 61, height 5' 2\" (1.575 m), weight 135 lb (61.2 kg).   - General: pleasant, no distress, good eye contact   - Neck: Op site healing well, no swelling or erythema,   - Cardiovascular: regular, normal rate, nl s1 and s2, no m/r/g   - Integumentary: skin is normal, no edema  - Neurological: reflexes 2+ at biceps, no tremors, - Chevostic sign  - Psychiatric: normal mood and affect    Data Reviewed:   - None    Assessment/Plan:   1) Hyperparathyroidism > Pt underwent parathyroidectomy and has recovered well. Her Ca and PTH levels look good and they have been good since her surgery in December 2016. Will check a renal panel today. 2) Hypovitaminosis D > Pt to continue her Vitamin D 2000 units daily. Will check a Vitamin D level today. 3) Pituitary Adenoma > The pathology showed no evidence of malignancy, but did show [address] only activity. Will check an [address] and UA    4) Post-surgical Hypothyroidism > Will check a Free T4 today and adjust her LT4 as needed. She will need 90 day refills of her LT4 when the levels come back. RTC 1 year    Pt voices understanding and agreement with the plan.     Copy sent to:  Dr. Amando Mejia

## 2018-11-02 NOTE — LETTER
11/5/2018 10:43 AM 
 
Ms. Renteriatamarleen [phone] [facility] Dear [place] Self: Please find your most recent results below. Resulted Orders [facility] AND LH Result Value Ref Range Luteinizing hormone 4.2 mIU/mL FSH 8.4 mIU/mL URINALYSIS W/ RFLX MICROSCOPIC Result Value Ref Range Specific Gravity 1.022 1.005 - 1.030  
 pH (UA) 6.5 5.0 - 7.5 Color Yellow Yellow Appearance Clear Clear Leukocyte Esterase Trace  Negative Protein Negative Negative/Trace Glucose Negative Negative Ketone Negative Negative Blood Negative Negative Bilirubin Negative Negative Urobilinogen 0.2 0.2 - 1.0 mg/dL Nitrites Negative Negative T4, FREE Result Value Ref Range T4, Free 1.49 0.82 - 1.77 ng/dL RENAL FUNCTION PANEL Result Value Ref Range Glucose 102 (H) 65 - 99 mg/dL BUN 18 8 - 27 mg/dL Creatinine 1.08 (H) 0.57 - 1.00 mg/dL GFR est non-AA 51 (L) >59 mL/min/1.73 GFR est AA 58 (L) >59 mL/min/1.73  
 BUN/Creatinine ratio 17 12 - 28 Sodium 141 134 - 144 mmol/L Potassium 4.2 3.5 - 5.2 mmol/L Chloride 102 96 - 106 mmol/L  
 CO2 25 20 - 29 mmol/L Calcium 9.1 8.7 - 10.3 mg/dL Phosphorus 4.1 2.5 - 4.5 mg/dL Albumin 4.8 3.5 - 4.8 g/dL VITAMIN D, 25 HYDROXY Result Value Ref Range VITAMIN D, 25-HYDROXY 42.1 30.0 - 100.0 ng/mL MICROSCOPIC EXAMINATION Result Value Ref Range WBC 6-10  0 - 5 /hpf  
 RBC 0-2 0 - 2 /hpf Epithelial cells 0-10 0 - 10 /hpf Casts None seen None seen /lpf Mucus Present Not Estab. Bacteria Few None seen/Few Your labs are looking good, I am not seeing any problems or issues. Please call me if you have any questions: [phone] Sincerely, Xavi Berry MD

## 2018-11-03 LAB
25(OH)D3+25(OH)D2 SERPL-MCNC: 42.1 NG/ML (ref 30–100)
ALBUMIN SERPL-MCNC: 4.8 G/DL (ref 3.5–4.8)
APPEARANCE UR: CLEAR
BACTERIA #/AREA URNS HPF: ABNORMAL /[HPF]
BILIRUB UR QL STRIP: NEGATIVE
BUN SERPL-MCNC: 18 MG/DL (ref 8–27)
BUN/CREAT SERPL: 17 (ref 12–28)
CALCIUM SERPL-MCNC: 9.1 MG/DL (ref 8.7–10.3)
CASTS URNS QL MICRO: ABNORMAL /LPF
CHLORIDE SERPL-SCNC: 102 MMOL/L (ref 96–106)
CO2 SERPL-SCNC: 25 MMOL/L (ref 20–29)
COLOR UR: YELLOW
CREAT SERPL-MCNC: 1.08 MG/DL (ref 0.57–1)
EPI CELLS #/AREA URNS HPF: ABNORMAL /HPF
FSH SERPL-ACNC: 8.4 MIU/ML
GLUCOSE SERPL-MCNC: 102 MG/DL (ref 65–99)
GLUCOSE UR QL: NEGATIVE
HGB UR QL STRIP: NEGATIVE
INTERPRETATION: NORMAL
KETONES UR QL STRIP: NEGATIVE
LEUKOCYTE ESTERASE UR QL STRIP: ABNORMAL
LH SERPL-ACNC: 4.2 MIU/ML
MICRO URNS: ABNORMAL
MUCOUS THREADS URNS QL MICRO: PRESENT
NITRITE UR QL STRIP: NEGATIVE
PH UR STRIP: 6.5 [PH] (ref 5–7.5)
PHOSPHATE SERPL-MCNC: 4.1 MG/DL (ref 2.5–4.5)
POTASSIUM SERPL-SCNC: 4.2 MMOL/L (ref 3.5–5.2)
PROT UR QL STRIP: NEGATIVE
RBC #/AREA URNS HPF: ABNORMAL /HPF
SODIUM SERPL-SCNC: 141 MMOL/L (ref 134–144)
SP GR UR: 1.02 (ref 1–1.03)
T4 FREE SERPL-MCNC: 1.49 NG/DL (ref 0.82–1.77)
UROBILINOGEN UR STRIP-MCNC: 0.2 MG/DL (ref 0.2–1)
WBC #/AREA URNS HPF: ABNORMAL /HPF

## 2018-11-05 NOTE — PROGRESS NOTES
Spoke with pt regarding her labs. Pt to continue her LT4 75mcg daily. Her FSH is not elevated. Her UA did not show evidence concerning for DI.

## 2019-01-29 ENCOUNTER — HOSPITAL ENCOUNTER (OUTPATIENT)
Dept: MRI IMAGING | Age: 74
Discharge: HOME | End: 2019-01-29
Attending: NEUROLOGICAL SURGERY
Payer: MEDICARE

## 2019-01-29 DIAGNOSIS — D49.7 PITUITARY TUMOR: ICD-10-CM

## 2019-01-29 PROCEDURE — 70553 MRI BRAIN STEM W/O & W/DYE: CPT

## 2019-01-29 PROCEDURE — A9575 INJ GADOTERATE MEGLUMI 0.1ML: HCPCS | Performed by: NEUROLOGICAL SURGERY

## 2019-01-29 PROCEDURE — 74011636320 HC RX REV CODE- 636/320: Performed by: NEUROLOGICAL SURGERY

## 2019-01-29 RX ADMIN — GADOTERATE MEGLUMINE 12 ML: 376.9 INJECTION INTRAVENOUS at 10:00

## 2019-05-23 NOTE — TELEPHONE ENCOUNTER
Patient called to say that she needs a refill on her Levothyroxine 75 mcg. She has switched to [facility] DR ALYSON THEODORE and would like this called into her new pharmacy please. She can be reached at:  [phone].       [facility]   [phone]  Levothyroxine  75 mcg

## 2019-05-24 RX ORDER — LEVOTHYROXINE SODIUM 75 UG/1
75 TABLET ORAL
Qty: 90 TAB | Refills: 3 | Status: SHIPPED | OUTPATIENT
Start: 2019-05-24 | End: 2019-11-05 | Stop reason: SDUPTHER

## 2019-11-04 ENCOUNTER — OFFICE VISIT (OUTPATIENT)
Dept: ENDOCRINOLOGY | Age: 74
End: 2019-11-04

## 2019-11-04 VITALS
WEIGHT: 138.8 LBS | HEIGHT: 62 IN | DIASTOLIC BLOOD PRESSURE: 75 MMHG | BODY MASS INDEX: 25.54 KG/M2 | HEART RATE: 63 BPM | SYSTOLIC BLOOD PRESSURE: 146 MMHG

## 2019-11-04 DIAGNOSIS — E89.0 POSTOPERATIVE HYPOTHYROIDISM: ICD-10-CM

## 2019-11-04 DIAGNOSIS — E55.9 HYPOVITAMINOSIS D: ICD-10-CM

## 2019-11-04 DIAGNOSIS — E21.0 PRIMARY HYPERPARATHYROIDISM (HCC): Primary | ICD-10-CM

## 2019-11-04 DIAGNOSIS — D35.2 PITUITARY ADENOMA (HCC): ICD-10-CM

## 2019-11-04 NOTE — PROGRESS NOTES
Chief Complaint   Patient presents with    Pituitary Problem     pcp and pharmacy verified    Thyroid Problem   Record since last visit reviewed  History of Present Illness: Giovanna Sierra is a 75 y.o. female here for follow up of primary hyperparathyroidism and pituitary adenoma s/p resection with resultant central hypothyroidism. On 10/4/17 she presented to the ED with HA and imaging studies showed a large pituitary mass. She was taken to the OR by Lana Olmos and Dr. Shahla Villagran and had resection of the mass. The pathology was read as benign and showed [address] [facility] [facility] [address] activity only. She tolerated the post-op hospitalization and was discharged home on LT4 75mcg daily and HC 10mg in the AM and 5mg with dinner. She was weaned off the Hydrocortisone and has not been on any steroids since November 2017. Her repeat MRI in January 2018 showed the post-op changes, but did not show any concerning features. Pt continues to follow with Dr. Jason Patel of [address] [place] [address]. She will see him in January 2020 for follow up MRI and repeat evaluation. At our visit in February 2018 she had reported weight gain since her surgery and we tested her for evidence of hypercortisolemia. Two consecutive Midnight salivary cortisol tests were not elevated. She was clinically and biochemically euthyroid on LT4 75mcg daily. She denies issues of lightheadedness, dizziness, HAs, palpitations, CP, SOB, syncope, or pre-syncope. She denies issues of perioral numbness or numbness in her hands and fingers. She denies issues of polyuria or polydipsia. She is still taking the LT4 75mcg and Vitamin D 2000 units daily. Pt notes she is keeping up with moderate activity, though she will take a nap in the mid-afternoon. She dose note that at night she will sometimes be restless an does not sleep through the night.      For her hyperparathyroidism our initial visit our work up included imaging which showed an oval solid nodule contiguous with the lower pole of the left   thyroid lobe compatible with enlarged parathyroid, and correlating with   today's nuclear imaging. It measures approximately 1.6 x 0.7 x 0.6 cm. The initial images demonstrate an asymmetric focus of increased radiotracer   localization adjacent to the lower pole of the left thyroid lobe. The delayed images demonstrate persistent radiotracer focal retention, left   Lower. Pt was referred to Dr. Leandrew Skiff, who took her to the OR on 12/2/16 and found two enlarged parathyroid glands (Inferior Left and Superior Right). These were resected and sent to pathology. They were both read as benign parathyroid adenoma. Her pre-op PTH was 242 and her post-op PTH was 7.3. Her Ca and PTH levels look good and they have been good since her surgery in December 2016. Current Outpatient Medications   Medication Sig    levothyroxine (SYNTHROID) 75 mcg tablet Take 1 Tab by mouth Daily (before breakfast).  cholecalciferol, vitamin D3, 2,000 unit tab One tablet daily    docusate sodium (COLACE) 50 mg capsule Take 2 Caps by mouth two (2) times daily as needed for Constipation. (Patient taking differently: Take 100 mg by mouth daily.)     No current facility-administered medications for this visit. Allergies   Allergen Reactions    Amlodipine Cough     Review of Systems:  - Cardiovascular: no chest pain  - Neurological: no tremors  - Integumentary: skin is normal    Physical Examination:  Blood pressure 146/75, pulse 63, height 5' 2\" (1.575 m), weight 138 lb 12.8 oz (63 kg).   - General: pleasant, no distress, good eye contact   - Neck: Op site healing well, no swelling or erythema,   - Cardiovascular: regular, normal rate, nl s1 and s2, no m/r/g   - Integumentary: skin is normal, no edema  - Neurological: reflexes 2+ at biceps, no tremors, - Chevostic sign  - Psychiatric: normal mood and affect    Data Reviewed:   - None    Assessment/Plan:   1) Hyperparathyroidism > Pt underwent parathyroidectomy and has recovered well. Her Ca and PTH levels look good and they have been good since her surgery in December 2016. Will check a renal panel today. 2) Hypovitaminosis D > Pt to continue her Vitamin D 2000 units daily. Will check a Vitamin D level today. 3) Pituitary Adenoma > The pathology showed no evidence of malignancy, but did show [address] only activity. Will check an [address] and UA    4) Post-surgical Hypothyroidism > Will check a Free T4 today and adjust her LT4 as needed. She will need 90 day refills of her LT4 when the levels come back. RTC 1 year    Pt voices understanding and agreement with the plan.     Copy sent to:  Dr. Luci Krause

## 2019-11-05 LAB
25(OH)D3+25(OH)D2 SERPL-MCNC: 38.4 NG/ML (ref 30–100)
ALBUMIN SERPL-MCNC: 4.8 G/DL (ref 3.5–4.8)
BUN SERPL-MCNC: 21 MG/DL (ref 8–27)
BUN/CREAT SERPL: 21 (ref 12–28)
CALCIUM SERPL-MCNC: 9.5 MG/DL (ref 8.7–10.3)
CHLORIDE SERPL-SCNC: 104 MMOL/L (ref 96–106)
CO2 SERPL-SCNC: 23 MMOL/L (ref 20–29)
CREAT SERPL-MCNC: 0.98 MG/DL (ref 0.57–1)
FSH SERPL-ACNC: 8.2 MIU/ML
GLUCOSE SERPL-MCNC: 103 MG/DL (ref 65–99)
GLUCOSE UR QL: NORMAL
INTERPRETATION: NORMAL
KETONES UR QL STRIP: NORMAL
LH SERPL-ACNC: 3.7 MIU/ML
PH UR STRIP: NORMAL [PH]
PHOSPHATE SERPL-MCNC: 4.1 MG/DL (ref 2.5–4.5)
POTASSIUM SERPL-SCNC: 4.6 MMOL/L (ref 3.5–5.2)
PROT UR QL STRIP: NORMAL
SODIUM SERPL-SCNC: 141 MMOL/L (ref 134–144)
SP GR UR: NORMAL
T4 FREE SERPL-MCNC: 1.34 NG/DL (ref 0.82–1.77)

## 2019-11-05 RX ORDER — LEVOTHYROXINE SODIUM 75 UG/1
75 TABLET ORAL
Qty: 90 TAB | Refills: 3 | Status: SHIPPED | OUTPATIENT
Start: 2019-11-05 | End: 2020-05-27

## 2019-11-05 RX ORDER — CHOLECALCIFEROL (VITAMIN D3) 125 MCG
CAPSULE ORAL
Qty: 90 TAB | Refills: 3 | Status: SHIPPED | OUTPATIENT
Start: 2019-11-05

## 2019-11-05 NOTE — PROGRESS NOTES
Spoke with pt regarding her labs. Her T4 was good, her FSH was not elevated and her Ca level was good.

## 2019-11-06 ENCOUNTER — TELEPHONE (OUTPATIENT)
Dept: ENDOCRINOLOGY | Age: 74
End: 2019-11-06

## 2019-11-06 NOTE — TELEPHONE ENCOUNTER
Emma Villanueva with [facility] called and stated that they did not receive a urine specimen for this patient.       Contact # [phone]

## 2020-02-20 ENCOUNTER — HOSPITAL ENCOUNTER (OUTPATIENT)
Dept: MRI IMAGING | Age: 75
Discharge: HOME | End: 2020-02-20
Attending: NEUROLOGICAL SURGERY
Payer: MEDICARE

## 2020-02-20 DIAGNOSIS — D49.7 PITUITARY TUMOR: ICD-10-CM

## 2020-02-20 PROCEDURE — 74011250636 HC RX REV CODE- 250/636: Performed by: NEUROLOGICAL SURGERY

## 2020-02-20 PROCEDURE — 70553 MRI BRAIN STEM W/O & W/DYE: CPT

## 2020-02-20 PROCEDURE — A9575 INJ GADOTERATE MEGLUMI 0.1ML: HCPCS | Performed by: NEUROLOGICAL SURGERY

## 2020-02-20 RX ORDER — GADOTERATE MEGLUMINE 376.9 MG/ML
12 INJECTION INTRAVENOUS
Status: COMPLETED | OUTPATIENT
Start: 2020-02-20 | End: 2020-02-20

## 2020-02-20 RX ADMIN — GADOTERATE MEGLUMINE 15 ML: 376.9 INJECTION INTRAVENOUS at 12:09

## 2020-03-24 PROBLEM — D35.2 PITUITARY ADENOMA (HCC): Status: RESOLVED | Noted: 2017-10-04 | Resolved: 2020-03-24

## 2020-03-26 PROBLEM — Z86.39 HISTORY OF HYPERPARATHYROIDISM: Status: ACTIVE | Noted: 2020-03-26

## 2020-03-30 PROBLEM — Z86.018 HISTORY OF PITUITARY ADENOMA: Status: ACTIVE | Noted: 2020-03-30

## 2020-05-27 RX ORDER — LEVOTHYROXINE SODIUM 75 UG/1
TABLET ORAL
Qty: 90 TAB | Refills: 0 | Status: SHIPPED | OUTPATIENT
Start: 2020-05-27 | End: 2020-08-24 | Stop reason: SDUPTHER

## 2020-08-21 ENCOUNTER — TELEPHONE (OUTPATIENT)
Dept: ENDOCRINOLOGY | Age: 75
End: 2020-08-21

## 2020-08-21 NOTE — TELEPHONE ENCOUNTER
----- Message from Josie Mahan sent at 8/21/2020  9:43 AM EDT -----  Regarding: Dr Alvarado/telephone  Pt needs a refill on Levothyroxine 75 mg call into [address] [place] [address], [phone], pt can be reach at [phone].

## 2020-08-24 RX ORDER — LEVOTHYROXINE SODIUM 75 UG/1
TABLET ORAL
Qty: 90 TAB | Refills: 1 | Status: SHIPPED | OUTPATIENT
Start: 2020-08-24 | End: 2021-03-02

## 2021-03-02 RX ORDER — LEVOTHYROXINE SODIUM 75 UG/1
TABLET ORAL
Qty: 30 TAB | Refills: 0 | Status: SHIPPED | OUTPATIENT
Start: 2021-03-02 | End: 2021-03-08

## 2021-03-03 DIAGNOSIS — E21.0 PRIMARY HYPERPARATHYROIDISM (HCC): Primary | ICD-10-CM

## 2021-03-03 DIAGNOSIS — E89.0 POSTOPERATIVE HYPOTHYROIDISM: ICD-10-CM

## 2021-03-03 DIAGNOSIS — D35.2 PITUITARY ADENOMA (HCC): ICD-10-CM

## 2021-03-03 DIAGNOSIS — E55.9 HYPOVITAMINOSIS D: ICD-10-CM

## 2021-03-08 RX ORDER — LEVOTHYROXINE SODIUM 75 UG/1
TABLET ORAL
Qty: 90 TAB | Refills: 0 | Status: SHIPPED | OUTPATIENT
Start: 2021-03-08 | End: 2021-04-09 | Stop reason: SDUPTHER

## 2021-03-10 ENCOUNTER — TRANSCRIBE ORDER (OUTPATIENT)
Dept: SCHEDULING | Age: 76
End: 2021-03-10

## 2021-03-10 DIAGNOSIS — D49.7 PITUITARY TUMOR: Primary | ICD-10-CM

## 2021-03-16 LAB
25(OH)D3+25(OH)D2 SERPL-MCNC: 43.4 NG/ML (ref 30–100)
ALBUMIN SERPL-MCNC: 4.8 G/DL (ref 3.7–4.7)
ALBUMIN/GLOB SERPL: 2.1 {RATIO} (ref 1.2–2.2)
ALP SERPL-CCNC: 96 IU/L (ref 39–117)
ALT SERPL-CCNC: 13 IU/L (ref 0–32)
APPEARANCE UR: CLEAR
AST SERPL-CCNC: 22 IU/L (ref 0–40)
BACTERIA #/AREA URNS HPF: NORMAL /[HPF]
BILIRUB SERPL-MCNC: 0.4 MG/DL (ref 0–1.2)
BILIRUB UR QL STRIP: NEGATIVE
BUN SERPL-MCNC: 20 MG/DL (ref 8–27)
BUN/CREAT SERPL: 18 (ref 12–28)
CALCIUM SERPL-MCNC: 9.6 MG/DL (ref 8.7–10.3)
CASTS URNS QL MICRO: NORMAL /LPF
CHLORIDE SERPL-SCNC: 101 MMOL/L (ref 96–106)
CO2 SERPL-SCNC: 23 MMOL/L (ref 20–29)
COLOR UR: YELLOW
CREAT SERPL-MCNC: 1.12 MG/DL (ref 0.57–1)
EPI CELLS #/AREA URNS HPF: NORMAL /HPF (ref 0–10)
FSH SERPL-ACNC: 7.4 MIU/ML
GLOBULIN SER CALC-MCNC: 2.3 G/DL (ref 1.5–4.5)
GLUCOSE SERPL-MCNC: 100 MG/DL (ref 65–99)
GLUCOSE UR QL: NEGATIVE
HGB UR QL STRIP: NEGATIVE
INTERPRETATION: NORMAL
KETONES UR QL STRIP: NEGATIVE
LEUKOCYTE ESTERASE UR QL STRIP: ABNORMAL
LH SERPL-ACNC: 3.9 MIU/ML
MICRO URNS: ABNORMAL
NITRITE UR QL STRIP: NEGATIVE
PH UR STRIP: 5.5 [PH] (ref 5–7.5)
PHOSPHATE SERPL-MCNC: 4.4 MG/DL (ref 3–4.3)
POTASSIUM SERPL-SCNC: 4.1 MMOL/L (ref 3.5–5.2)
PROT SERPL-MCNC: 7.1 G/DL (ref 6–8.5)
PROT UR QL STRIP: NEGATIVE
RBC #/AREA URNS HPF: NORMAL /HPF (ref 0–2)
SODIUM SERPL-SCNC: 140 MMOL/L (ref 134–144)
SP GR UR: 1.02 (ref 1–1.03)
T4 FREE SERPL-MCNC: 1.42 NG/DL (ref 0.82–1.77)
UROBILINOGEN UR STRIP-MCNC: 0.2 MG/DL (ref 0.2–1)
WBC #/AREA URNS HPF: NORMAL /HPF (ref 0–5)

## 2021-04-09 ENCOUNTER — TELEPHONE (OUTPATIENT)
Dept: ENDOCRINOLOGY | Age: 76
End: 2021-04-09

## 2021-04-09 RX ORDER — LEVOTHYROXINE SODIUM 75 UG/1
TABLET ORAL
Qty: 90 TAB | Refills: 1 | Status: SHIPPED | OUTPATIENT
Start: 2021-04-09 | End: 2021-10-05

## 2021-04-09 NOTE — TELEPHONE ENCOUNTER
Called pt and spoke with her regarding her labs. Results for orders placed or performed in visit on 03/03/21   RENAL FUNCTION PANEL   Result Value Ref Range    Phosphorus 4.4 (H) 3.0 - 4.3 mg/dL   METABOLIC PANEL, COMPREHENSIVE   Result Value Ref Range    Glucose 100 (H) 65 - 99 mg/dL    BUN 20 8 - 27 mg/dL    Creatinine 1.12 (H) 0.57 - 1.00 mg/dL    GFR est non-AA 48 (L) >59 mL/min/1.73    GFR est AA 55 (L) >59 mL/min/1.73    BUN/Creatinine ratio 18 12 - 28    Sodium 140 134 - 144 mmol/L    Potassium 4.1 3.5 - 5.2 mmol/L    Chloride 101 96 - 106 mmol/L    CO2 23 20 - 29 mmol/L    Calcium 9.6 8.7 - 10.3 mg/dL    Protein, total 7.1 6.0 - 8.5 g/dL    Albumin 4.8 (H) 3.7 - 4.7 g/dL    GLOBULIN, TOTAL 2.3 1.5 - 4.5 g/dL    A-G Ratio 2.1 1.2 - 2.2    Bilirubin, total 0.4 0.0 - 1.2 mg/dL    Alk. phosphatase 96 39 - 117 IU/L    AST (SGOT) 22 0 - 40 IU/L    ALT (SGPT) 13 0 - 32 IU/L   T4, FREE   Result Value Ref Range    T4, Free 1.42 0.82 - 1.77 ng/dL   VITAMIN D, 25 HYDROXY   Result Value Ref Range    VITAMIN D, 25-HYDROXY 43.4 30.0 - 100.0 ng/mL   FSH AND LH   Result Value Ref Range    Luteinizing hormone 3.9 mIU/mL    FSH 7.4 mIU/mL   URINALYSIS W/ RFLX MICROSCOPIC   Result Value Ref Range    Specific Gravity 1.016 1.005 - 1.030    pH (UA) 5.5 5.0 - 7.5    Color Yellow Yellow    Appearance Clear Clear    Leukocyte Esterase 2+ (A) Negative    Protein Negative Negative/Trace    Glucose Negative Negative    Ketone Negative Negative    Blood Negative Negative    Bilirubin Negative Negative    Urobilinogen 0.2 0.2 - 1.0 mg/dL    Nitrites Negative Negative    Microscopic Examination See additional order    MICROSCOPIC EXAMINATION   Result Value Ref Range    WBC None seen 0 - 5 /hpf    RBC 0-2 0 - 2 /hpf    Epithelial cells 0-10 0 - 10 /hpf    Casts None seen None seen /lpf    Bacteria None seen None seen/Few   CKD REPORT   Result Value Ref Range    Interpretation Note      She needs refills on her LT4 75mcg per day.

## 2021-04-09 NOTE — TELEPHONE ENCOUNTER
----- Message from Aki Dolan sent at 4/9/2021  8:10 AM EDT -----  Regarding: Dr Alvarado/telephone  Pt is calling to see if the doctor has her Test Results, please call pt at [phone]

## 2021-04-10 ENCOUNTER — HOSPITAL ENCOUNTER (OUTPATIENT)
Dept: MRI IMAGING | Age: 76
Discharge: HOME | End: 2021-04-10
Attending: NEUROLOGICAL SURGERY
Payer: MEDICARE

## 2021-04-10 DIAGNOSIS — D49.7 PITUITARY TUMOR: ICD-10-CM

## 2021-04-10 PROCEDURE — A9575 INJ GADOTERATE MEGLUMI 0.1ML: HCPCS | Performed by: NEUROLOGICAL SURGERY

## 2021-04-10 PROCEDURE — 74011250636 HC RX REV CODE- 250/636: Performed by: NEUROLOGICAL SURGERY

## 2021-04-10 PROCEDURE — 70553 MRI BRAIN STEM W/O & W/DYE: CPT

## 2021-04-10 RX ORDER — GADOTERATE MEGLUMINE 376.9 MG/ML
13 INJECTION INTRAVENOUS
Status: COMPLETED | OUTPATIENT
Start: 2021-04-10 | End: 2021-04-10

## 2021-04-10 RX ADMIN — GADOTERATE MEGLUMINE 13 ML: 376.9 INJECTION INTRAVENOUS at 09:20

## 2021-04-19 ENCOUNTER — VIRTUAL VISIT (OUTPATIENT)
Dept: ENDOCRINOLOGY | Age: 76
End: 2021-04-19
Payer: MEDICARE

## 2021-04-19 DIAGNOSIS — E55.9 HYPOVITAMINOSIS D: ICD-10-CM

## 2021-04-19 DIAGNOSIS — E21.0 PRIMARY HYPERPARATHYROIDISM (HCC): Primary | ICD-10-CM

## 2021-04-19 DIAGNOSIS — E89.0 POSTOPERATIVE HYPOTHYROIDISM: ICD-10-CM

## 2021-04-19 DIAGNOSIS — D35.2 PITUITARY ADENOMA (HCC): ICD-10-CM

## 2021-04-19 PROCEDURE — 99442 PR PHYS/QHP TELEPHONE EVALUATION 11-20 MIN: CPT | Performed by: INTERNAL MEDICINE

## 2021-04-19 NOTE — PROGRESS NOTES
Chief Complaint   Patient presents with    Pituitary Problem     pcp and pharmacy verified. TELEPHONE CALL   Record since last visit reviewed         **THIS IS A VIRTUAL VISIT VIA A TELEPHONE ENCOUNTER. PATIENT AGREED TO HAVE THEIR CARE DELIVERED OVER THE PHONE IN PLACE OF THEIR REGULARLY SCHEDULED OFFICE VISIT**        History of Present Illness: Giovanna Sierra is a 67 y.o. female here for follow up of primary hyperparathyroidism and pituitary adenoma s/p resection with resultant central hypothyroidism. On 10/4/17 she presented to the ED with HA and imaging studies showed a large pituitary mass. She was taken to the OR by Bethany Correa and Dr. Connor Merritt and had resection of the mass. The pathology was read as benign and showed [facility] activity only. She tolerated the post-op hospitalization and was discharged home on LT4 75mcg daily and HC 10mg in the AM and 5mg with dinner. She was weaned off the Hydrocortisone and has not been on any steroids since November 2017. Her repeat MRI in January 2018 showed the post-op changes, but did not show any concerning features. Pt continues to follow with Dr. Sylvain Tang of [address] [place] [address]. She will see him in January 2020 for follow up MRI and repeat evaluation. She denies issues of lightheadedness, dizziness, HAs, palpitations, CP, SOB, syncope, or pre-syncope. She denies issues of perioral numbness or numbness in her hands and fingers. She denies issues of polyuria or polydipsia. She denies issues of dysphagia or dysphonia. She is still taking the LT4 75mcg and Vitamin D 2000 units daily. Pt notes she had the J&J COVID vaccine. For her hyperparathyroidism our initial visit our work up included imaging which showed an oval solid nodule contiguous with the lower pole of the left   thyroid lobe compatible with enlarged parathyroid, and correlating with   today's nuclear imaging. It measures approximately 1.6 x 0.7 x 0.6 cm.   The initial images demonstrate an asymmetric focus of increased radiotracer   localization adjacent to the lower pole of the left thyroid lobe. The delayed images demonstrate persistent radiotracer focal retention, left   Lower. Pt was referred to Dr. Shelby Aguirre, who took her to the OR on 12/2/16 and found two enlarged parathyroid glands (Inferior Left and Superior Right). These were resected and sent to pathology. They were both read as benign parathyroid adenoma. Her pre-op PTH was 242 and her post-op PTH was 7.3. Her Ca and PTH levels look good and they have been good since her surgery in December 2016. Current Outpatient Medications   Medication Sig    levothyroxine (SYNTHROID) 75 mcg tablet TAKE 1 TABLET BY MOUTH ONCE DAILY BEFORE BREAKFAST    cholecalciferol, vitamin D3, 2,000 unit tab One tablet daily    docusate sodium (COLACE) 50 mg capsule Take 2 Caps by mouth two (2) times daily as needed for Constipation. (Patient taking differently: Take 100 mg by mouth daily.)     No current facility-administered medications for this visit. Allergies   Allergen Reactions    Amlodipine Cough     Review of Systems:  - Cardiovascular: no chest pain  - Neurological: no tremors  - Integumentary: skin is normal    Physical Examination:  There were no vitals taken for this visit.   None  Data Reviewed:   Component      Latest Ref Rng & Units 3/15/2021 3/15/2021 3/15/2021 3/15/2021           9:40 AM  9:40 AM  9:40 AM  9:40 AM   Glucose      65 - 99 mg/dL    100 (H)   BUN      8 - 27 mg/dL    20   Creatinine      0.57 - 1.00 mg/dL    1.12 (H)   GFR est non-AA      >59 mL/min/1.73    48 (L)   GFR est AA      >59 mL/min/1.73    55 (L)   BUN/Creatinine ratio      12 - 28    18   Sodium      134 - 144 mmol/L    140   Potassium      3.5 - 5.2 mmol/L    4.1   Chloride      96 - 106 mmol/L    101   CO2      20 - 29 mmol/L    23   Calcium      8.7 - 10.3 mg/dL    9.6   Protein, total      6.0 - 8.5 g/dL    7.1   Albumin      3.7 - 4.7 g/dL    4.8 (H)   GLOBULIN, TOTAL      1.5 - 4.5 g/dL    2.3   A-G Ratio      1.2 - 2.2    2.1   Bilirubin, total      0.0 - 1.2 mg/dL    0.4   Alk. phosphatase      39 - 117 IU/L    96   AST      0 - 40 IU/L    22   ALT      0 - 32 IU/L    13   Luteinizing hormone      mIU/mL 3.9      FSH      mIU/mL 7.4      T4, Free      0.82 - 1.77 ng/dL   1.42    VITAMIN D, 25-HYDROXY      30.0 - 100.0 ng/mL  43.4       Component      Latest Ref Rng & Units 3/15/2021           9:40 AM   Phosphorus      3.0 - 4.3 mg/dL 4.4 (H)       Assessment/Plan:   1) Hyperparathyroidism > Pt is s/p parathyroidectomy in December 2016. Her Ca in March 2021 was 9.6. 2) Hypovitaminosis D > Pt to continue her Vitamin D 2000 units daily. Her Vitamin D in March 2021 was 43.4    3) Pituitary Adenoma > The pathology showed no evidence of malignancy, but did show [address] only activity. Her [address] in March 2021 was 7.4 with LH 3.9.     4) Post-surgical Hypothyroidism > Her FT4 in March 2021 was 1.42. Pt to continue the LT4 75mcg daily. RTC 1 year    Pt voices understanding and agreement with the plan. I spent 13 minutes of total time during this virtual visit with a telephone encounter. All questions were answered and a copy of the instructions were sent to her via [employer]/a letter.        Copy sent to:  Dr. Jael Nichols

## 2021-09-08 NOTE — CONSULTS
[address]   [address] [facility], [facility]   [facility]       Name:  Renita Solorzano   MR#:  [id number]   :  1945   Account #:  [de-identified]    Date of Consultation:  10/05/2017   Date of Adm:  10/04/2017       REASON FOR CONSULTATION: Pituitary mass. REQUESTING PHYSICIAN: Carlos Garza MD    CONSULTING PHYSICIAN: [id number]. Louise Cloud MD    HISTORY OF PRESENT ILLNESS: I was aked by Dr. Toro Robledo to see   this pleasant 78-year-old female who presents with a pituitary mass. She has complained of progressive malaise and headaches over the   last year or so, which seems to be getting worse. She sought medical   care and was found to have hypercalcemia, was treated for her   hyperparathyroidism surgery and has continued to complain of   worsening headaches and malaise. She was recently seen for a   neurosurgical consultation due to some abnormal imaging studies and   a pituitary mass was identified. More recently, she began developing   diplopia and significant hyponatremia was noted. She is now admitted   to the hospital urgently for care of this problem. I am asked to evaluate   the patient for transsphenoidal approach. The patient denies any prior   history of nasal surgery or trauma. She denies any problem with her   sense of smell. She denies any prior history of chronic or recurrent   sinusitis. She has a past medical history significant for degenerative   joint disease, hypercholesterolemia, hypertension, osteopenia, and   hyperparathyroidism, which she has had surgery in the past for with     total abdominal hysterectomy, bilateral salpingo-oophorectomy and   tonsillectomy. MEDICATIONS AT THIS TIME   Include:    1. Colace. 2. Norvasc. 3. Ergocalciferol. REVIEW OF SYSTEMS: She denies any nausea, vomiting, shortness   of breath, nasal drainage, nasal obstruction, dysuria, dyspnea,   wheezing, palpitations or chest pains.  Her review of systems is   unremarkable other Patient refused to wear BIPAP tonight. than her present complaints. PHYSICAL EXAMINATION   GENERAL: Examination today at the bedside demonstrates a well-  appearing female who is alert and cooperative. She answers questions   appropriately. HEENT: The oral cavity demonstrates normal mucosa with no lesions   present. Tonsils are absent. Palpation of the neck demonstrates a   midline larynx and trachea with no abnormal adenopathy. The ears   demonstrate tympanic membranes to be intact and mobile with no   evidence of middle ear effusion or infection. External auditory canals   normal. Nasal cavity demonstrates good nasal airway. There were no   intranasal masses or polyps. The nasal mucosa is grossly normal.     I reviewed the patient's MRI scan. This demonstrates a sizeable   pituitary mass bulging into the sphenoid sinus. This is consistent with a   cystic mass of proteinaceous material felt to be suspicious for   old hemorrhage. There were no intranasal masses. The remainder of   the exam is unremarkable. IMPRESSION: Pituitary mass now with visual changes and symptoms   of panhypopituitarism. I agree that the patient is an excellent candidate   for a transsphenoidal approach. The approach through the septum and   sphenoid was discussed with the patient and her spouse. Complications   of surgery including bleeding, infection, CSF, rhinorrhea, nasal septal   perforation, nasal synechia, alteration in sense of smell, have all been   discussed and we will plan to proceed tomorrow with her   transsphenoidal approach.         MD PERLITA Ag / Layne   D:  10/05/2017   16:31   T:  10/05/2017   17:25   Job #:  [id number]

## 2021-10-05 ENCOUNTER — TELEPHONE (OUTPATIENT)
Dept: ENDOCRINOLOGY | Age: 76
End: 2021-10-05

## 2021-10-05 RX ORDER — LEVOTHYROXINE SODIUM 75 UG/1
TABLET ORAL
Qty: 90 TABLET | Refills: 0 | Status: SHIPPED | OUTPATIENT
Start: 2021-10-05 | End: 2022-04-04

## 2021-10-05 NOTE — TELEPHONE ENCOUNTER
Per chart, patient is seen for thyroid and prescribed levothyroxine. Advised VM to call [facility] and ask them to send the refill request to the original prescriber.

## 2021-10-05 NOTE — TELEPHONE ENCOUNTER
----- Message from April Rabb sent at 10/5/2021  8:39 AM EDT -----  Regarding: /Telephone  Medication Refill    Caller (if not patient): N/A      Relationship of caller (if not patient): N/A      Best contact number(s): [phone]      Name of medication and dosage if known: :\" Enloudapens \"       Is patient out of this medication (yes/no): No , one more week left       Pharmacy name: [facility] listed in chart? (yes/no): Yes  Pharmacy phone number:      Details to clarify the request: Refill request       April Rabb

## 2021-12-01 DIAGNOSIS — E21.0 PRIMARY HYPERPARATHYROIDISM (HCC): Primary | ICD-10-CM

## 2021-12-01 DIAGNOSIS — E89.0 POSTOPERATIVE HYPOTHYROIDISM: ICD-10-CM

## 2021-12-01 DIAGNOSIS — E55.9 HYPOVITAMINOSIS D: ICD-10-CM

## 2021-12-01 DIAGNOSIS — D35.2 PITUITARY ADENOMA (HCC): ICD-10-CM

## 2022-03-18 PROBLEM — Z86.018 HISTORY OF PITUITARY ADENOMA: Status: ACTIVE | Noted: 2020-03-30

## 2022-03-18 PROBLEM — Z86.39 HISTORY OF HYPERPARATHYROIDISM: Status: ACTIVE | Noted: 2020-03-26

## 2022-03-19 PROBLEM — E55.9 HYPOVITAMINOSIS D: Status: ACTIVE | Noted: 2017-09-07

## 2022-04-01 DIAGNOSIS — E55.9 HYPOVITAMINOSIS D: ICD-10-CM

## 2022-04-01 DIAGNOSIS — E89.0 POSTOPERATIVE HYPOTHYROIDISM: ICD-10-CM

## 2022-04-01 DIAGNOSIS — E21.0 PRIMARY HYPERPARATHYROIDISM (HCC): ICD-10-CM

## 2022-04-06 LAB
1,25(OH)2D SERPL-MCNC: 35.5 PG/ML (ref 19.9–79.3)
25(OH)D3+25(OH)D2 SERPL-MCNC: 39.8 NG/ML (ref 30–100)
ALBUMIN SERPL-MCNC: 4.9 G/DL (ref 3.7–4.7)
BUN SERPL-MCNC: 19 MG/DL (ref 8–27)
BUN/CREAT SERPL: 18 (ref 12–28)
CALCIUM SERPL-MCNC: 9.4 MG/DL (ref 8.7–10.3)
CHLORIDE SERPL-SCNC: 99 MMOL/L (ref 96–106)
CO2 SERPL-SCNC: 22 MMOL/L (ref 20–29)
CREAT SERPL-MCNC: 1.03 MG/DL (ref 0.57–1)
EGFR: 56 ML/MIN/1.73
GLUCOSE SERPL-MCNC: 100 MG/DL (ref 65–99)
INTERPRETATION: NORMAL
PHOSPHATE SERPL-MCNC: 3.9 MG/DL (ref 3–4.3)
POTASSIUM SERPL-SCNC: 4 MMOL/L (ref 3.5–5.2)
PTH-INTACT SERPL-MCNC: 28 PG/ML (ref 15–65)
SODIUM SERPL-SCNC: 141 MMOL/L (ref 134–144)
T4 FREE SERPL-MCNC: 1.6 NG/DL (ref 0.82–1.77)

## 2022-04-22 ENCOUNTER — VIRTUAL VISIT (OUTPATIENT)
Dept: ENDOCRINOLOGY | Age: 77
End: 2022-04-22
Payer: MEDICARE

## 2022-04-22 DIAGNOSIS — D35.2 PITUITARY ADENOMA (HCC): ICD-10-CM

## 2022-04-22 DIAGNOSIS — E21.0 PRIMARY HYPERPARATHYROIDISM (HCC): Primary | ICD-10-CM

## 2022-04-22 DIAGNOSIS — E89.0 POSTOPERATIVE HYPOTHYROIDISM: ICD-10-CM

## 2022-04-22 DIAGNOSIS — E55.9 HYPOVITAMINOSIS D: ICD-10-CM

## 2022-04-22 PROCEDURE — 99442 PR PHYS/QHP TELEPHONE EVALUATION 11-20 MIN: CPT | Performed by: INTERNAL MEDICINE

## 2022-04-22 RX ORDER — LEVOTHYROXINE SODIUM 75 UG/1
75 TABLET ORAL
Qty: 90 TABLET | Refills: 3 | Status: SHIPPED | OUTPATIENT
Start: 2022-04-22

## 2022-10-21 ENCOUNTER — OFFICE VISIT (OUTPATIENT)
Dept: SURGERY | Age: 77
End: 2022-10-21
Payer: MEDICARE

## 2022-10-21 VITALS
BODY MASS INDEX: 24.73 KG/M2 | WEIGHT: 134.4 LBS | SYSTOLIC BLOOD PRESSURE: 170 MMHG | DIASTOLIC BLOOD PRESSURE: 71 MMHG | OXYGEN SATURATION: 97 % | HEIGHT: 62 IN | RESPIRATION RATE: 18 BRPM | TEMPERATURE: 97.1 F | HEART RATE: 61 BPM

## 2022-10-21 DIAGNOSIS — L98.9 SKIN LESION OF LEFT LEG: Primary | ICD-10-CM

## 2022-10-21 PROCEDURE — 1090F PRES/ABSN URINE INCON ASSESS: CPT | Performed by: SURGERY

## 2022-10-21 PROCEDURE — G8536 NO DOC ELDER MAL SCRN: HCPCS | Performed by: SURGERY

## 2022-10-21 PROCEDURE — G8420 CALC BMI NORM PARAMETERS: HCPCS | Performed by: SURGERY

## 2022-10-21 PROCEDURE — G8400 PT W/DXA NO RESULTS DOC: HCPCS | Performed by: SURGERY

## 2022-10-21 PROCEDURE — 1101F PT FALLS ASSESS-DOCD LE1/YR: CPT | Performed by: SURGERY

## 2022-10-21 PROCEDURE — 1123F ACP DISCUSS/DSCN MKR DOCD: CPT | Performed by: SURGERY

## 2022-10-21 PROCEDURE — 99203 OFFICE O/P NEW LOW 30 MIN: CPT | Performed by: SURGERY

## 2022-10-21 PROCEDURE — G8510 SCR DEP NEG, NO PLAN REQD: HCPCS | Performed by: SURGERY

## 2022-10-21 PROCEDURE — G8427 DOCREV CUR MEDS BY ELIG CLIN: HCPCS | Performed by: SURGERY

## 2022-10-21 NOTE — LETTER
10/21/2022    Patient: Bridget Sierra   YOB: 1945   Date of Visit: 10/21/2022     Anjali Ordonez MD  [facility]  [address]  [address] [address]  Via Fax: [phone]    Dear Anjali Ordonez MD,      Thank you for referring Ms. Giovanna Sierra to  [facility] for evaluation. My notes for this consultation are attached. If you have questions, please do not hesitate to call me. I look forward to following your patient along with you.       Sincerely,    Tatiana Henriquez MD

## 2022-10-21 NOTE — PROGRESS NOTES
HISTORY OF PRESENT ILLNESS  Giovanna Sierra is a 65 y.o. female who comes in for consultation by Radha Gillespie MD for a leg lesion  HPI  She has noted an enlarging lesion on the left lower leg over the last few months. She denies trauma, ulceration or drainage. 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   She does not have a dermatologist.  She has seen Dr Whitley Hui but he was unable to schedule it until 2023. Past Medical History:   Diagnosis Date    Adverse effect of anesthesia     \"TAKES A LONG TIME FOR ME TO COME OUT OF IT\"    Arthritis     High cholesterol     Hypertension     Osteopenia     Pituitary adenoma ([facility] [address] 10/4/2017    Primary hyperparathyroidism ([facility] [address] 2015    Thyroid disease     Parathyroid gland removal     Past Surgical History:   Procedure Laterality Date    HX HEENT      parathyroidectomy    HX OTHER SURGICAL  2006    FACE LIFT     HX PARATHYROIDECTOMY  2016    HX AMANDA AND BSO  1980s    HX TONSILLECTOMY  age 9     Family History   Problem Relation Age of Onset    Stroke Mother     Hypertension Mother     Heart Attack Mother     No Known Problems Father          in the war    Heart Attack Maternal Aunt     Cancer Maternal Aunt         bone cancer    Anesth Problems Neg Hx      Social History     Tobacco Use    Smoking status: Never    Smokeless tobacco: Never   Vaping Use    Vaping Use: Never used   Substance Use Topics    Alcohol use: No     Alcohol/week: 0.0 standard drinks    Drug use: No     Current Outpatient Medications   Medication Sig    levothyroxine (Euthyrox) 75 mcg tablet Take 1 Tablet by mouth Daily (before breakfast). cholecalciferol, vitamin D3, 2,000 unit tab One tablet daily    docusate sodium (COLACE) 50 mg capsule Take 2 Caps by mouth two (2) times daily as needed for Constipation. (Patient not taking: Reported on 10/21/2022)     No current facility-administered medications for this visit.      Allergies   Allergen Reactions    Amlodipine Cough Prednisone Cough     \"bloated\"         Review of Systems   Constitutional:  Negative for chills, diaphoresis, fever and weight loss. HENT:  Positive for congestion. Negative for sore throat. Eyes:  Negative for blurred vision and discharge. Respiratory:  Negative for cough, shortness of breath and wheezing. Cardiovascular:  Negative for chest pain, palpitations, orthopnea, claudication and leg swelling. Gastrointestinal:  Negative for abdominal pain, constipation, diarrhea, heartburn, melena, nausea and vomiting. Genitourinary:  Negative for dysuria, flank pain, frequency and hematuria. Musculoskeletal:  Negative for back pain, joint pain, myalgias and neck pain. Skin:  Negative for rash. Neurological:  Negative for dizziness, speech change, focal weakness, seizures, loss of consciousness, weakness and headaches. Endo/Heme/Allergies:  Does not bruise/bleed easily. Psychiatric/Behavioral:  Negative for depression and memory loss. Visit Vitals  BP (!) 170/71 (BP 1 Location: Right arm, BP Patient Position: Sitting, BP Cuff Size: Small adult)   Pulse 61   Temp 97.1 °F (36.2 °C) (Temporal)   Resp 18   Ht 5' 2\" (1.575 m)   Wt 61 kg (134 lb 6.4 oz)   SpO2 97%   BMI 24.58 kg/m²       Physical Exam  Vitals and nursing note reviewed. Exam conducted with a chaperone present. Constitutional:       General: She is not in acute distress. Appearance: She is well-developed. She is not diaphoretic. HENT:      Head: Normocephalic and atraumatic. Nose: Nose normal.      Mouth/Throat:      Pharynx: Oropharynx is clear. No oropharyngeal exudate. Eyes:      General: No scleral icterus. Conjunctiva/sclera: Conjunctivae normal.      Pupils: Pupils are equal, round, and reactive to light. Neck:      Thyroid: No thyromegaly. Vascular: No JVD. Trachea: No tracheal deviation. Cardiovascular:      Rate and Rhythm: Normal rate and regular rhythm. Heart sounds: No murmur heard. No friction rub. No gallop. Pulmonary:      Effort: Pulmonary effort is normal. No respiratory distress. Breath sounds: Normal breath sounds. No wheezing or rales. Abdominal:      General: Abdomen is flat. Bowel sounds are normal. There is no distension. Palpations: Abdomen is soft. There is no mass. Tenderness: There is no abdominal tenderness. There is no guarding or rebound. Musculoskeletal:         General: Normal range of motion. Cervical back: Normal range of motion and neck supple. Lymphadenopathy:      Cervical: No cervical adenopathy. Skin:     General: Skin is warm and dry. Coloration: Skin is not pale. Findings: No erythema or rash. Comments: Proximal left anterior lower leg with 1 cm raised pinkish lesion with central horn  Slightly tender  No ulceration or drainage   Neurological:      Mental Status: She is alert and oriented to person, place, and time. Cranial Nerves: No cranial nerve deficit. Psychiatric:         Behavior: Behavior normal.         Thought Content: Thought content normal.         Judgment: Judgment normal.       ASSESSMENT and PLAN    Left lower leg lesion. Highly suspicious for a squamous cell ca. I explained to her about the anatomy and pathophysiology of the process and recommendation for excision. Risks of excision include, but are not limited to, bleeding, infection, recurrence, poor healing/cosmesis. Hypothyroidism. Stable on LT4    We discussed excision but I did not have an opening until 12/2.    She prefers waiting until Jan with Dr Jes Singletary MD FACS

## 2022-10-21 NOTE — PROGRESS NOTES
Identified pt with two pt identifiers (name and ). Reviewed chart in preparation for visit and have obtained necessary documentation. Giovanna Sierra is a 65 y.o. 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 Visit Vitals  BP (!) 170/71 (BP 1 Location: Right arm, BP Patient Position: Sitting, BP Cuff Size: Small adult)   Pulse 61   Temp 97.1 °F (36.2 °C) (Temporal)   Resp 18   Ht 5' 2\" (1.575 m)   Wt 61 kg (134 lb 6.4 oz)   SpO2 97%   BMI 24.58 kg/m²       1. Have you been to the ER, urgent care clinic since your last visit? Hospitalized since your last visit? No    2. Have you seen or consulted any other health care providers outside of the [address] [place] [address] since your last visit? Include any pap smears or colon screening. Dr. Fide Campos, plastics. Pt saw Dr. Annamaria Burch, he was unable to perform procedure until 2023 and Pt would like procedure done before then. Pt states spot has been on her leg 4-6 weeks.

## 2022-11-01 DIAGNOSIS — E55.9 HYPOVITAMINOSIS D: ICD-10-CM

## 2022-11-01 DIAGNOSIS — E89.0 POSTOPERATIVE HYPOTHYROIDISM: ICD-10-CM

## 2022-11-01 DIAGNOSIS — D35.2 PITUITARY ADENOMA (HCC): Primary | ICD-10-CM

## 2023-03-29 LAB
25(OH)D3+25(OH)D2 SERPL-MCNC: 39.1 NG/ML (ref 30–100)
ALBUMIN SERPL-MCNC: 4.7 G/DL (ref 3.7–4.7)
BUN SERPL-MCNC: 18 MG/DL (ref 8–27)
BUN/CREAT SERPL: 16 (ref 12–28)
CALCIUM SERPL-MCNC: 9.8 MG/DL (ref 8.7–10.3)
CHLORIDE SERPL-SCNC: 99 MMOL/L (ref 96–106)
CO2 SERPL-SCNC: 24 MMOL/L (ref 20–29)
CREAT SERPL-MCNC: 1.15 MG/DL (ref 0.57–1)
EGFRCR SERPLBLD CKD-EPI 2021: 49 ML/MIN/1.73
FSH SERPL-ACNC: 7 MIU/ML
GLUCOSE SERPL-MCNC: 101 MG/DL (ref 70–99)
LH SERPL-ACNC: 3.9 MIU/ML
PHOSPHATE SERPL-MCNC: 4.6 MG/DL (ref 3–4.3)
POTASSIUM SERPL-SCNC: 4.1 MMOL/L (ref 3.5–5.2)
REPORT: NORMAL
SODIUM SERPL-SCNC: 138 MMOL/L (ref 134–144)
T4 FREE SERPL-MCNC: 1.68 NG/DL (ref 0.82–1.77)
T4 SERPL-MCNC: 10.6 UG/DL (ref 4.5–12)

## 2023-04-01 DIAGNOSIS — E89.0 POSTOPERATIVE HYPOTHYROIDISM: ICD-10-CM

## 2023-04-01 DIAGNOSIS — D35.2 PITUITARY ADENOMA (HCC): ICD-10-CM

## 2023-04-01 DIAGNOSIS — E55.9 HYPOVITAMINOSIS D: ICD-10-CM

## 2023-07-03 RX ORDER — LEVOTHYROXINE SODIUM 0.07 MG/1
TABLET ORAL
Qty: 90 TABLET | Refills: 0 | Status: SHIPPED | OUTPATIENT
Start: 2023-07-03

## 2023-11-27 ENCOUNTER — OFFICE VISIT (OUTPATIENT)
Age: 78
End: 2023-11-27
Payer: MEDICARE

## 2023-11-27 VITALS
BODY MASS INDEX: 27.01 KG/M2 | HEART RATE: 66 BPM | OXYGEN SATURATION: 98 % | DIASTOLIC BLOOD PRESSURE: 70 MMHG | SYSTOLIC BLOOD PRESSURE: 136 MMHG | HEIGHT: 60 IN | WEIGHT: 137.6 LBS | TEMPERATURE: 97.5 F | RESPIRATION RATE: 20 BRPM

## 2023-11-27 DIAGNOSIS — M85.80 OSTEOPENIA, UNSPECIFIED LOCATION: ICD-10-CM

## 2023-11-27 DIAGNOSIS — E78.2 MIXED HYPERLIPIDEMIA: ICD-10-CM

## 2023-11-27 DIAGNOSIS — E55.9 HYPOVITAMINOSIS D: ICD-10-CM

## 2023-11-27 DIAGNOSIS — Z11.59 ENCOUNTER FOR HEPATITIS C SCREENING TEST FOR LOW RISK PATIENT: ICD-10-CM

## 2023-11-27 DIAGNOSIS — Z76.89 ENCOUNTER TO ESTABLISH CARE: ICD-10-CM

## 2023-11-27 DIAGNOSIS — Z86.018 HISTORY OF PITUITARY ADENOMA: ICD-10-CM

## 2023-11-27 DIAGNOSIS — R73.09 ELEVATED GLUCOSE: ICD-10-CM

## 2023-11-27 DIAGNOSIS — I10 ESSENTIAL (PRIMARY) HYPERTENSION: ICD-10-CM

## 2023-11-27 DIAGNOSIS — E21.3 HYPERPARATHYROIDISM, UNSPECIFIED (HCC): ICD-10-CM

## 2023-11-27 DIAGNOSIS — E89.0 POSTOPERATIVE HYPOTHYROIDISM: ICD-10-CM

## 2023-11-27 DIAGNOSIS — N18.31 STAGE 3A CHRONIC KIDNEY DISEASE (HCC): ICD-10-CM

## 2023-11-27 DIAGNOSIS — Z00.00 MEDICARE ANNUAL WELLNESS VISIT, SUBSEQUENT: Primary | ICD-10-CM

## 2023-11-27 DIAGNOSIS — Z86.39 HISTORY OF HYPERPARATHYROIDISM: ICD-10-CM

## 2023-11-27 PROBLEM — N18.30 CHRONIC RENAL DISEASE, STAGE III (HCC): Status: ACTIVE | Noted: 2023-11-27

## 2023-11-27 PROCEDURE — G8419 CALC BMI OUT NRM PARAM NOF/U: HCPCS

## 2023-11-27 PROCEDURE — 99204 OFFICE O/P NEW MOD 45 MIN: CPT

## 2023-11-27 PROCEDURE — 4004F PT TOBACCO SCREEN RCVD TLK: CPT

## 2023-11-27 PROCEDURE — 1090F PRES/ABSN URINE INCON ASSESS: CPT

## 2023-11-27 PROCEDURE — G8427 DOCREV CUR MEDS BY ELIG CLIN: HCPCS

## 2023-11-27 PROCEDURE — 36415 COLL VENOUS BLD VENIPUNCTURE: CPT

## 2023-11-27 PROCEDURE — 3078F DIAST BP <80 MM HG: CPT

## 2023-11-27 PROCEDURE — G0439 PPPS, SUBSEQ VISIT: HCPCS

## 2023-11-27 PROCEDURE — 1123F ACP DISCUSS/DSCN MKR DOCD: CPT

## 2023-11-27 PROCEDURE — G8400 PT W/DXA NO RESULTS DOC: HCPCS

## 2023-11-27 PROCEDURE — 3075F SYST BP GE 130 - 139MM HG: CPT

## 2023-11-27 PROCEDURE — G8484 FLU IMMUNIZE NO ADMIN: HCPCS

## 2023-11-27 RX ORDER — LEVOTHYROXINE SODIUM 75 MCG
75 TABLET ORAL DAILY
Qty: 90 TABLET | Refills: 3 | Status: SHIPPED | OUTPATIENT
Start: 2023-11-27

## 2023-11-27 SDOH — ECONOMIC STABILITY: FOOD INSECURITY: WITHIN THE PAST 12 MONTHS, YOU WORRIED THAT YOUR FOOD WOULD RUN OUT BEFORE YOU GOT MONEY TO BUY MORE.: NEVER TRUE

## 2023-11-27 SDOH — ECONOMIC STABILITY: INCOME INSECURITY: HOW HARD IS IT FOR YOU TO PAY FOR THE VERY BASICS LIKE FOOD, HOUSING, MEDICAL CARE, AND HEATING?: NOT HARD AT ALL

## 2023-11-27 SDOH — ECONOMIC STABILITY: HOUSING INSECURITY
IN THE LAST 12 MONTHS, WAS THERE A TIME WHEN YOU DID NOT HAVE A STEADY PLACE TO SLEEP OR SLEPT IN A SHELTER (INCLUDING NOW)?: NO

## 2023-11-27 SDOH — ECONOMIC STABILITY: FOOD INSECURITY: WITHIN THE PAST 12 MONTHS, THE FOOD YOU BOUGHT JUST DIDN'T LAST AND YOU DIDN'T HAVE MONEY TO GET MORE.: NEVER TRUE

## 2023-11-27 ASSESSMENT — PATIENT HEALTH QUESTIONNAIRE - PHQ9
SUM OF ALL RESPONSES TO PHQ9 QUESTIONS 1 & 2: 0
SUM OF ALL RESPONSES TO PHQ QUESTIONS 1-9: 0
1. LITTLE INTEREST OR PLEASURE IN DOING THINGS: 0
2. FEELING DOWN, DEPRESSED OR HOPELESS: 0

## 2023-11-27 ASSESSMENT — LIFESTYLE VARIABLES
HOW OFTEN DO YOU HAVE A DRINK CONTAINING ALCOHOL: NEVER
HOW OFTEN DO YOU HAVE A DRINK CONTAINING ALCOHOL: NEVER
HOW MANY STANDARD DRINKS CONTAINING ALCOHOL DO YOU HAVE ON A TYPICAL DAY: PATIENT DOES NOT DRINK

## 2023-11-27 NOTE — ACP (ADVANCE CARE PLANNING)
Discussed importance of advanced medical directives with patient. Patient is capable of making decisions. Discussed advanced directives 11/27/2023. [place] advance directive form discussed with pt. Pt will consider options and give us written form back for inclusion in chart.     DANISH Rahman - NP

## 2023-11-27 NOTE — PATIENT INSTRUCTIONS

## 2023-11-28 LAB
25(OH)D3 SERPL-MCNC: 31.8 NG/ML (ref 30–100)
ALBUMIN SERPL-MCNC: 4.4 G/DL (ref 3.5–5)
ALBUMIN/GLOB SERPL: 1.5 (ref 1.1–2.2)
ALP SERPL-CCNC: 94 U/L (ref 45–117)
ALT SERPL-CCNC: 20 U/L (ref 12–78)
ANION GAP SERPL CALC-SCNC: 4 MMOL/L (ref 5–15)
AST SERPL-CCNC: 19 U/L (ref 15–37)
BASOPHILS # BLD: 0.1 K/UL (ref 0–0.1)
BASOPHILS NFR BLD: 1 % (ref 0–1)
BILIRUB SERPL-MCNC: 0.3 MG/DL (ref 0.2–1)
BUN SERPL-MCNC: 23 MG/DL (ref 6–20)
BUN/CREAT SERPL: 22 (ref 12–20)
CALCIUM SERPL-MCNC: 8.7 MG/DL (ref 8.5–10.1)
CHLORIDE SERPL-SCNC: 108 MMOL/L (ref 97–108)
CHOLEST SERPL-MCNC: 268 MG/DL
CO2 SERPL-SCNC: 26 MMOL/L (ref 21–32)
CREAT SERPL-MCNC: 1.03 MG/DL (ref 0.55–1.02)
DIFFERENTIAL METHOD BLD: NORMAL
EOSINOPHIL # BLD: 0.2 K/UL (ref 0–0.4)
EOSINOPHIL NFR BLD: 3 % (ref 0–7)
ERYTHROCYTE [DISTWIDTH] IN BLOOD BY AUTOMATED COUNT: 13.3 % (ref 11.5–14.5)
EST. AVERAGE GLUCOSE BLD GHB EST-MCNC: 105 MG/DL
GLOBULIN SER CALC-MCNC: 2.9 G/DL (ref 2–4)
GLUCOSE SERPL-MCNC: 104 MG/DL (ref 65–100)
HBA1C MFR BLD: 5.3 % (ref 4–5.6)
HCT VFR BLD AUTO: 42.2 % (ref 35–47)
HCV AB SER IA-ACNC: 0.06 INDEX
HCV AB SERPL QL IA: NONREACTIVE
HDLC SERPL-MCNC: 48 MG/DL
HDLC SERPL: 5.6 (ref 0–5)
HGB BLD-MCNC: 13.3 G/DL (ref 11.5–16)
IMM GRANULOCYTES # BLD AUTO: 0 K/UL (ref 0–0.04)
IMM GRANULOCYTES NFR BLD AUTO: 0 % (ref 0–0.5)
LDLC SERPL CALC-MCNC: 186.2 MG/DL (ref 0–100)
LYMPHOCYTES # BLD: 1.2 K/UL (ref 0.8–3.5)
LYMPHOCYTES NFR BLD: 22 % (ref 12–49)
MCH RBC QN AUTO: 30.1 PG (ref 26–34)
MCHC RBC AUTO-ENTMCNC: 31.5 G/DL (ref 30–36.5)
MCV RBC AUTO: 95.5 FL (ref 80–99)
MONOCYTES # BLD: 0.5 K/UL (ref 0–1)
MONOCYTES NFR BLD: 9 % (ref 5–13)
NEUTS SEG # BLD: 3.7 K/UL (ref 1.8–8)
NEUTS SEG NFR BLD: 65 % (ref 32–75)
NRBC # BLD: 0 K/UL (ref 0–0.01)
NRBC BLD-RTO: 0 PER 100 WBC
PLATELET # BLD AUTO: 193 K/UL (ref 150–400)
PMV BLD AUTO: 11.1 FL (ref 8.9–12.9)
POTASSIUM SERPL-SCNC: 4.3 MMOL/L (ref 3.5–5.1)
PROT SERPL-MCNC: 7.3 G/DL (ref 6.4–8.2)
RBC # BLD AUTO: 4.42 M/UL (ref 3.8–5.2)
SODIUM SERPL-SCNC: 138 MMOL/L (ref 136–145)
T4 FREE SERPL-MCNC: 0.9 NG/DL (ref 0.8–1.5)
TRIGL SERPL-MCNC: 169 MG/DL
TSH SERPL DL<=0.05 MIU/L-ACNC: 2.87 UIU/ML (ref 0.36–3.74)
VLDLC SERPL CALC-MCNC: 33.8 MG/DL
WBC # BLD AUTO: 5.6 K/UL (ref 3.6–11)

## 2023-12-01 ENCOUNTER — HOSPITAL ENCOUNTER (OUTPATIENT)
Facility: HOSPITAL | Age: 78
End: 2023-12-01
Payer: MEDICARE

## 2023-12-01 DIAGNOSIS — E21.3 HYPERPARATHYROIDISM, UNSPECIFIED (HCC): ICD-10-CM

## 2023-12-01 DIAGNOSIS — M85.80 OSTEOPENIA, UNSPECIFIED LOCATION: ICD-10-CM

## 2023-12-01 PROCEDURE — 77080 DXA BONE DENSITY AXIAL: CPT

## 2023-12-05 ENCOUNTER — TELEPHONE (OUTPATIENT)
Age: 78
End: 2023-12-05

## 2025-04-22 ENCOUNTER — TRANSCRIBE ORDERS (OUTPATIENT)
Facility: HOSPITAL | Age: 80
End: 2025-04-22

## 2025-04-22 DIAGNOSIS — D49.7 PITUITARY TUMOR: Primary | ICD-10-CM

## 2025-04-22 NOTE — PROGRESS NOTES
Records and notes reviewed. I see that she is negative on her I/O for the last 24 hours and her NA has improved to 137. His Urine Osm and Spec. Grav show that she is concentrating her urine well. Continue the 1200 ml fluid restriction for now. 26

## 2025-05-05 ENCOUNTER — HOSPITAL ENCOUNTER (OUTPATIENT)
Facility: HOSPITAL | Age: 80
Discharge: HOME | End: 2025-05-08
Attending: NEUROLOGICAL SURGERY
Payer: MEDICARE

## 2025-05-05 DIAGNOSIS — D49.7 PITUITARY TUMOR: ICD-10-CM

## 2025-05-05 PROCEDURE — 6360000004 HC RX CONTRAST MEDICATION: Performed by: NEUROLOGICAL SURGERY

## 2025-05-05 PROCEDURE — 70553 MRI BRAIN STEM W/O & W/DYE: CPT

## 2025-05-05 PROCEDURE — A9579 GAD-BASE MR CONTRAST NOS,1ML: HCPCS | Performed by: NEUROLOGICAL SURGERY

## 2025-05-05 RX ADMIN — GADOTERIDOL 13 ML: 279.3 INJECTION, SOLUTION INTRAVENOUS at 10:04

## (undated) DEVICE — FLOSEAL MATRIX IS INDICATED IN SURGICAL PROCEDURES (OTHER THAN IN OPHTHALMIC) AS AN ADJUNCT TO HEMOSTASIS WHEN CONTROL OF BLEEDING BY LIGATURE OR CONVENTIONAL PROCEDURES IS INEFFECTIVE OR IMPRACTICAL.: Brand: FLOSEAL HEMOSTATIC MATRIX

## (undated) DEVICE — EYE PADSSTERILENOT MADE WITH NATURAL RUBBER LATEXSINGLE USE ONLYDO NOT USE IF PACKAGE OPENED OR DAMAGED: Brand: CARDINAL HEALTH

## (undated) DEVICE — KENDALL SCD EXPRESS SLEEVES, KNEE LENGTH, MEDIUM: Brand: KENDALL SCD

## (undated) DEVICE — STERILE POLYISOPRENE POWDER-FREE SURGICAL GLOVES WITH EMOLLIENT COATING: Brand: PROTEXIS

## (undated) DEVICE — SOLUTION IRRIG 1000ML H2O STRL BLT

## (undated) DEVICE — 1200 GUARD II KIT W/5MM TUBE W/O VAC TUBE: Brand: GUARDIAN

## (undated) DEVICE — CODMAN® INTEGRATED BIPOLAR CORD AND TUBING SET FLYING LEADS, ROTARY PUMP: Brand: CODMAN®

## (undated) DEVICE — BONE WAX WHITE: Brand: BONE WAX WHITE

## (undated) DEVICE — PACKING 400411 10PK DOYLE NASAL: Brand: MEROCEL®

## (undated) DEVICE — MARKER SKIN XR REFLCT LF SPHR DISP

## (undated) DEVICE — SURGICAL PROCEDURE PACK BASIN MAJ SET CUST NO CAUT

## (undated) DEVICE — 3M™ IOBAN™ 2 ANTIMICROBIAL INCISE DRAPE 6648EZ: Brand: IOBAN™ 2

## (undated) DEVICE — TRAY PREP DRY W/ PREM GLV 2 APPL 6 SPNG 2 UNDPD 1 OVERWRAP

## (undated) DEVICE — CODMAN® SURGICAL PATTIES 1/2" X 1/2" (1.27CM X 1.27CM): Brand: CODMAN®

## (undated) DEVICE — INTENDED FOR TISSUE SEPARATION, AND OTHER PROCEDURES THAT REQUIRE A SHARP SURGICAL BLADE TO PUNCTURE OR CUT.: Brand: BARD-PARKER ® CARBON RIB-BACK BLADES

## (undated) DEVICE — INFECTION CONTROL KIT SYS

## (undated) DEVICE — REM POLYHESIVE ADULT PATIENT RETURN ELECTRODE: Brand: VALLEYLAB

## (undated) DEVICE — (D)SYR 10ML 1/5ML GRAD NSAF -- PKGING CHANGE USE ITEM 338027

## (undated) DEVICE — DEVON™ KNEE AND BODY STRAP 60" X 3" (1.5 M X 7.6 CM): Brand: DEVON

## (undated) DEVICE — CODMAN® SURGICAL PATTIES 1/2" X 3" (1.27CM X 7.62CM): Brand: CODMAN®

## (undated) DEVICE — INSULATED BLADE ELECTRODE: Brand: EDGE

## (undated) DEVICE — SLIM BODY SKIN STAPLER: Brand: APPOSE ULC

## (undated) DEVICE — ROCKER SWITCH PENCIL BLADE ELECTRODE, HOLSTER: Brand: EDGE

## (undated) DEVICE — MEDI-VAC NON-CONDUCTIVE SUCTION TUBING: Brand: CARDINAL HEALTH

## (undated) DEVICE — 3M™ STERI-STRIP™ REINFORCED ADHESIVE SKIN CLOSURES, R1546, 1/4 IN X 4 IN (6 MM X 100 MM), 10 STRIPS/ENVELOPE: Brand: 3M™ STERI-STRIP™

## (undated) DEVICE — DRAPE MICSCP W46XL120IN POLY DRAWSTRAP W STEREO OBS TB AND

## (undated) DEVICE — HANDLE LT SNAP ON ULT DURABLE LENS FOR TRUMPF ALC DISPOSABLE

## (undated) DEVICE — NEEDLE HYPO 25GA L1.5IN BVL ORIENTED ECLIPSE

## (undated) DEVICE — DERMABOND SKIN ADH 0.7ML -- DERMABOND ADVANCED 12/BX

## (undated) DEVICE — DRAPE,LAPAROTOMY,PED,STERILE: Brand: MEDLINE

## (undated) DEVICE — SUTURE MCRYL SZ 4-0 L27IN ABSRB UD L19MM PS-2 1/2 CIR PRIM Y426H

## (undated) DEVICE — TRAY CATH OD16FR SIL URIN M STATLOK STBL DEV SURSTP

## (undated) DEVICE — SUTURE VCRL SZ 2-0 L18IN ABSRB UD L26MM CP-2 1/2 CIR REV J762D

## (undated) DEVICE — DRAPE,REIN 53X77,STERILE: Brand: MEDLINE

## (undated) DEVICE — SUTURE CHROMIC GUT SZ 3-0 L27IN ABSRB BRN L19MM FS-2 3/8 636H

## (undated) DEVICE — ASTOUND STANDARD SURGICAL GOWN, XXL: Brand: CONVERTORS

## (undated) DEVICE — PACK,EENT,TURBAN DRAPE,PK II: Brand: MEDLINE

## (undated) DEVICE — TOWEL SURG W17XL27IN STD BLU COT NONFENESTRATED PREWASHED

## (undated) DEVICE — COVER,MAYO STAND,STERILE: Brand: MEDLINE